# Patient Record
Sex: FEMALE | Race: WHITE
[De-identification: names, ages, dates, MRNs, and addresses within clinical notes are randomized per-mention and may not be internally consistent; named-entity substitution may affect disease eponyms.]

---

## 2023-02-12 ENCOUNTER — HOSPITAL ENCOUNTER (EMERGENCY)
Dept: HOSPITAL 97 - ER | Age: 58
LOS: 1 days | Discharge: HOME | End: 2023-02-13
Payer: COMMERCIAL

## 2023-02-12 DIAGNOSIS — Z98.84: ICD-10-CM

## 2023-02-12 DIAGNOSIS — I10: ICD-10-CM

## 2023-02-12 DIAGNOSIS — R10.9: Primary | ICD-10-CM

## 2023-02-12 DIAGNOSIS — Z88.5: ICD-10-CM

## 2023-02-12 LAB
ALBUMIN SERPL BCP-MCNC: 4 G/DL (ref 3.4–5)
ALP SERPL-CCNC: 57 U/L (ref 45–117)
ALT SERPL W P-5'-P-CCNC: 25 U/L (ref 13–56)
AST SERPL W P-5'-P-CCNC: 25 U/L (ref 15–37)
BUN BLD-MCNC: 21 MG/DL (ref 7–18)
GLUCOSE SERPLBLD-MCNC: 105 MG/DL (ref 74–106)
HCT VFR BLD CALC: 37.5 % (ref 36–45)
LIPASE SERPL-CCNC: 164 U/L (ref 73–393)
LYMPHOCYTES # SPEC AUTO: 1.4 K/UL (ref 0.7–4.9)
MCV RBC: 87.9 FL (ref 80–100)
PMV BLD: 7.9 FL (ref 7.6–11.3)
POTASSIUM SERPL-SCNC: 3.4 MMOL/L (ref 3.5–5.1)
RBC # BLD: 4.27 M/UL (ref 3.86–4.86)

## 2023-02-12 PROCEDURE — 74177 CT ABD & PELVIS W/CONTRAST: CPT

## 2023-02-12 PROCEDURE — 81015 MICROSCOPIC EXAM OF URINE: CPT

## 2023-02-12 PROCEDURE — 80053 COMPREHEN METABOLIC PANEL: CPT

## 2023-02-12 PROCEDURE — 83690 ASSAY OF LIPASE: CPT

## 2023-02-12 PROCEDURE — 36415 COLL VENOUS BLD VENIPUNCTURE: CPT

## 2023-02-12 PROCEDURE — 81003 URINALYSIS AUTO W/O SCOPE: CPT

## 2023-02-12 PROCEDURE — 96375 TX/PRO/DX INJ NEW DRUG ADDON: CPT

## 2023-02-12 PROCEDURE — 96361 HYDRATE IV INFUSION ADD-ON: CPT

## 2023-02-12 PROCEDURE — 96374 THER/PROPH/DIAG INJ IV PUSH: CPT

## 2023-02-12 PROCEDURE — 99284 EMERGENCY DEPT VISIT MOD MDM: CPT

## 2023-02-12 PROCEDURE — 85025 COMPLETE CBC W/AUTO DIFF WBC: CPT

## 2023-02-12 NOTE — RAD REPORT
EXAM DESCRIPTION:  CTAbdomen   Pelvis W Contrast - 2/12/2023 10:12 pm

 

CLINICAL HISTORY:  Abdominal pain.

ABD PAIN

 

COMPARISON:  <Comparisons>

 

TECHNIQUE:  Biphasic CT imaging of the abdomen and pelvis was performed with 100 ml non-ionic IV cont
rast.

 

All CT scans are performed using dose optimization technique as appropriate and may include automated
 exposure control or mA/KV adjustment according to patient size.

 

FINDINGS:  The lung bases are clear.Postsurgical changes involving the stomach.

 

The liver, spleen, pancreas, adrenal glands and kidneys are within normal limits. Small benign liver 
cysts.

 

No bowel obstruction, free air, free fluid or abscess. Small fat containing ventral hernia. The appen
jovon is normal. Sigmoid diverticulosis coli without diverticulitis. No evidence of significant lymphad
enopathy.

 

Mild lumbar degenerative changes.

 

IMPRESSION:  No acute intra-abdominal or pelvic finding.

 

Sigmoid diverticulosis coli without diverticulitis.

## 2023-02-12 NOTE — XMS REPORT
Continuity of Care Document

                          Created on:2023



Patient:ADRIANNE KAMARA

Sex:Female

:1965

External Reference #:[id number]





Demographics







                          Address                   [address]



                                                    [place] [address]

 

                          Home Phone                [phone]

 

                          Work Phone                [phone]

 

                          Mobile Phone              [phone]

 

                          Email Address             [place]

 

                          Preferred Language        English

 

                          Marital Status            Unknown

 

                          Latter-day Affiliation     Unknown

 

                          Race                      Unknown

 

                          Additional Race(s)        White

 

                          Ethnic Group              Unknown









Author







                          Organization              [facility]

t

 

                          Address                   [address]



                                                    [place] [address]

 

                          Phone                     [phone]









Support







                Name            Relationship    Address         Phone

 

                Adrianne Kamara Unavailable     [address]    [phone]



                                                [place] [address] 

 

                Jeanette Newell   Other           [address] [phone]



                                                [place] [address] 









Care Team Providers







                    Name                Role                Phone

 

                    FOUND, PCP NOT      Primary Care Physician Unavailable

 

                    [email or web address]_G  Attending Clinician Unavailable

 

                    NEIL MCNULTY FNP Attending Clinician Unavailable

 

                    GC_[email or web address]_D  Attending Clinician Unavailable

 

                    GC_[email or web address]_Mammo    Attending Clinician Unavailable

 

                    Neil Mcnulty       Attending Clinician [phone]

 

                    Kimberly Christensen          Attending Clinician [phone]

 

                    Arnoldo Garcia      Attending Clinician [phone]

 

                    Ang Morales Attending Clinician [phone]

 

                    Nemesio Cardenas  Attending Clinician [phone]

 

                    GC_[email or web address]_G  Admitting Clinician Unavailable

 

                    GC_[email or web address]_D  Admitting Clinician Unavailable

 

                    [email or web address]    Admitting Clinician Unavailable

 

                    Nemesio Cardenas  Admitting Clinician [phone]









Payers







           Payer Name Policy Type Policy Number Effective Date Expiration Date [facility]            [id number]                        







Problems







       Condition Condition Condition Status Onset  Resolution Last   Treating Co

mments 

Source



       Name   Details Category        Date   Date   Treatment Clinician        



                                                 Date                 

 

       Seasonal Seasonal Problem Active                              [facility]

a



       allergic Allergic                                              [facility]

l



       rhinitis Rhinitis               00:00:                             



                                   00                                 

 

       Vaginal Vaginal Problem Active                              [facility]



       dryness Dryness               3-                               Medical



                                   00:00:                             



                                   00                                 

 

       Bilateral Bilateral Problem Active                              [facility]

via



       temporoman Temporoman               3-17                               Me

dical



       dibular dibular               00:00:                             



       joint  Joint                00                                 



       disorder Disorder                                                  

 

       WEAKNESS  WEAKNESS Diagnosis Active 2019-1        2019-10-19             

  [facility]



              Active               0          11:00:00               l



              10/14/2019               07:00:                             Allen garsia



               [facility]               00                                 



              Re[facility]                                                   

 

       M25.511  M25.511 Diagnosis Active 2019               

[facility]



              Active                         15:06:00               l



              2019               00:00:                             Allen garsia



                [facility]               00                                 



              Re[facility]                                                   

 

       MORBID   MORBID Diagnosis Active 2018-0        2018-10-03               M

[facility]oria



       OBESITY OBESITY                         12:18:00               l



              Active               00:00:                             Anibal



              2018               00                                 



               [facility]                                                  

 

       Herpes Herpes Problem Active                              [facility]



       simplex Simplex                                              Medical



                                   00:00:                             



                                   00                                 

 

       Hyperlipid Hyperlipid Problem Active                              P

rivia



       emia   emia                                                Medical



                                   00:00:                             



                                   00                                 

 

       Depressive Depressive Problem Active                              P

rivia



       disorder Disorder                                              Medica

l



                                   00:00:                             



                                   00                                 

 

       Attention Attention Problem Active                              [facility]

via



       deficit Deficit                                              Medical



       hyperactiv Hyperactiv               00:00:                             



       ity    ity                  00                                 



       disorder Disorder                                                  

 

       Hypertensi Hypertensi Problem Active                              P

rivia



       ve     ve                                                  Medical



       disorder Disorder               00:00:                             



                                   00                                 

 

       Acid   Acid   Problem Active                              [facility]



       reflux Reflux                                              Medical



                                   00:00:                             



                                   00                                 

 

       Irritable Irritable Problem Active                              [facility]

via



       bowel  Bowel                                               Medical



       syndrome Syndrome               00:00:                             



                                   00                                 

 

       Osteoarthr Osteoarthr Problem Active                              P

rivia



       itis   itis                                                Medical



                                   00:00:                             



                                   00                                 

 

       Multiple Multiple Problem Active                              [facility]

a



       environmen Environmen                                              Me

dical



       geraldine    geraldine                  00:00:                             



       allergies Allergies               00                                 

 

       Preop  Preop  Disease Active                              [facility]



       cardiovasc cardiovasc               4-13                               st



       ular exam ular exam               00:00:                             Hosp

oksana



                                   00                                 l

 

       Mixed  Mixed  Disease Active                              [facility]



       hyperlipid hyperlipid               4-13                               st



       emia   emia                 00:00:                             [facility]



                                   00                                 l

 

       Essential Essential Disease Active                              Met

hodi



       hypertensi hypertensi               4-13                               st



       on     on                   00:00:                             [facility]



                                   00                                 l

 

       Morbid Morbid Disease Active                              [facility]



       obesity obesity                                              st



       due to due to               00:00:                             [facility]



       excess excess               00                                 l



       calories calories                                                  

 

       Depression Depression Disease Active                              M

ethodi



                                   7-29                               st



                                   00:00:                             [facility]



                                   00                                 l

 

       GERD   GERD   Disease Active                              [facility]



       (gastroeso (gastroeso               



       phageal phageal               00:00:                             [facility]



       reflux reflux               00                                 l



       disease) disease)                                                  

 

       Osteoarthr Osteoarthr Disease Active                              M

ethodi



       itis   itis                 



                                   00:00:                             [facility]



                                   00                                 l

 

       Sciatica Sciatica Disease Active                              [facility]

di



                                   



                                   00:00:                             [facility]



                                   00                                 l

 

       Asthma Asthma Problem Active                                    [facility]



                                                                      Medical

 

       Thyroid Thyroid Problem Active                                    [facility]



       function Function                                                  [facility]ica

l



       tests  Tests                                                   



       abnormal Abnormal                                                  

 

       Hyperlipid  Hyperlipi Problem                      2019            

   [facility]



       emia,  demia,                             11:24:37               l



       unspecifie unspecifie                                                  He

rmann



       d      d                                                       



              2019                                                  



              [facility]                                                  

 

       Essential  Essential Problem                      2019             

  [facility]



       (primary) (primary)                             11:24:37               l



       hypertensi hypertensi                                                  He

rmann



       on     on                                                      



              2019                                                  



              [facility]                                                  

 

       Gastro-eso  Gastro-es Problem                      2019            

   [facility]



       phageal ophageal                             11:24:37               l



       reflux reflux                                                  [facility]



       disease disease                                                  



       without without                                                  



       esophagiti esophagiti                                                  



       s      s                                                       



              2019                                                  



              [facility]                                                  

 

       Diaphragma        Problem                      2019               M

emoria



       tic hernia Diaphragma                             11:24:37               

l



       without tic hernia                                                  Deanna

nn



       obstructio without                                                  



       n or   obstructio                                                  



       gangrene n or                                                    



              gangrene                                                  



              2019                                                  



               [facility]                                                  

 

       Body mass  Body mass Problem                      2019             

  [facility]



       index  index                              11:24:37               l



       (BMI)  (BMI)                                                   [place]



       45.0-49.9, 45.0-49.9,                                                  



       adult  adult                                                   



              2019                                                  



              [facility]                                                  

 

       Mixed   Mixed Problem Active               2019-11-15               [facility]

ia



       anxiety anxiety                             00:29:56               l



       and    and                                                     [facility]



       depressive depressive                                                  



       disorder disorder                                                  



       (disorder) (disorder)                                                  



               Active                                                  



              Problem                                                  



              11/15/2019                                                  



              IRIS Post,                                                  



              ELIZABETH Alligator,                                                  



              [facility]                                                  

 

       History of  History Problem Active               2019-11-15              

 [facility]



       - obesity of -                               00:29:56               l



       (context-d obesity                                                  Deanna

nn



       ependent (context-d                                                  



       category) ependent                                                  



              category)                                                  



              Active                                                  



              Problem                                                  



              11/15/2019                                                  



              IRIS Post,                                                  



              ELIZABETH Alligator,                                                  



              [facility]                                                  

 

       Obstructiv  Obstructi Problem Active               2019-11-15            

   [facility]



       e sleep ve sleep                             00:29:56               l



       apnea  apnea                                                   [place]



       syndrome syndrome                                                  



       (disorder) (disorder)                                                  



              Active                                                  



              Problem                                                  



              11/15/2019                                                  



               [facility]                                                  



              Rehab,                                                  



              ELIZABETH Alligator,                                                  



              [facility]                                                  

 

       PAIN IN  PAIN IN Diagnosis Active               2019               

[facility]



       RIGHT  RIGHT                              15:06:00               l



       SHOULDER SHOULDER                                                  Allen

n



              Active                                                   



              [facility]                                                  

 

       MORBID  MORBID Diagnosis Active               2018-10-03               Me

moria



       (SEVERE) (SEVERE)                             12:18:00               l



       OBESITY OBESITY                                                  Anibal



       DUE TO DUE TO                                                  



       EXCESS CA EXCESS CA                                                  



              Active  [facility]                                                  







History of Past Illness







       Condition Condition Condition Status Onset  Resolution Last   Treating Co

mments 

Source



       Name   Details Category        Date   Date   Treatment Clinician        



                                                 Date                 

 

       Encounter  Encounter Problem        2019         

      [facility]



       for    for                     13:06:33 13:06:33               l



       screening screening               06:03:                             [facility]

seda



       mammogram mammogram               46                                 



       for    for                                                     



       malignant malignant                                                  



       neoplasm neoplasm                                                  



       of breast of breast                                                  



              2018                                                  



              9  ELIZABETH                                                  



              Alligator                                                  

 

       Morbid  Morbid Problem        2019               

[facility]



       (severe) (severe)               0-11   11:24:37 11:24:37               l



       obesity obesity               04:17:                             [facility]



       due to due to               40                                 



       excess excess                                                  



       calories calories                                                  



              10/11/2018                                                  



              2019                                                  



              [facility]                                                  







Allergies, Adverse Reactions, Alerts







       Allergy Allergy Status Severity Reaction(s) Onset  Inactive Treating Comm

ents 

Source



       Name   Type                        Date   Date   Clinician        

 

       Codeshena Mota Active        Other (See                "Makes me 

[facility]



              ty to                Comments)                  tense" st



              adverse                      00:00:                      [facility]



              reaction                      00                          l



              s to                                                    



              drug                                                    

 

       Morphine Propensi Active        Itching                       [facility]

di



              ty to                                               st



              adverse                      00:00:                      [facility]



              reaction                      00                          l



              s to                                                    



              drug                                                    

 

       morphine morphine Active                                           [facility]

a



                                                                      l



                                                                      [place]

 

       Lisinopr Allergy Active Mild   Cough                              [facility]



       il     to                                                      Medical



              substanc                                                  



              e                                                       







Family History







           Family Member Diagnosis  Comments   Start Date Stop Date  Source

 

           Paternal   Heart disease                                  [facility]

 

           Paternal   Cancer                                      [facility]

 

           Paternal   Colon cancer                                  [facility]

 

           Paternal uncle Diabetes                                    [facility]

 

           Natural father Heart disease                                  [facility]

 

           Natural father Hypertension                                  [facility]

 

           Maternal   Heart disease                                  [facility]

 

           Maternal   Pancreatic cancer                                  [facility]

 

           Natural mother Anesthesia problems                                  [facility]

 

           Natural mother Arthritis                                   [facility]







Social History







           Social Habit Start Date Stop Date  Quantity   Comments   Source

 

           History [facility] Tobacco                                             [facility]



           Use                                                    [facility]

ent



                                                                  Clinics

 

           Alcohol intake 2018 Current               [facility]



                      00:00:00   00:00:00   non-drinker of            Hospital



                                            alcohol               



                                            (finding)             

 

           Cigarettes smoked 2018                       [facility]

st



           current (pack per 00:00:00   00:00:00                         [facility]osp[facility]

l



           day) - Reported                                             

 

           Cigarette  2018                       [facility]



           pack-years 00:00:00   00:00:00                         Hospital

 

           Tobacco use and 2018 Smokeless             [facility]



           exposure   00:00:00   00:00:00   tobacco non-user            Hospital

 

           Sex Assigned At 1965                       [facility]



           B[facility]      00:00:00   [facility]:00:00                         [facility]









                Smoking Status  Start Date      Stop Date       Source

 

                Social History  2018-10-01 13:01:58 2018-10-01 13:01:58 [facility]







Medications







       Ordered Filled Start  Stop   Current Ordering Indication Dosage Frequency

 Signature

                    Comments            Components          Source



     Medication Medication Date Date Medication? Clinician                (SIG) 

          



     Name Name                                                   

 

     diclofenac      2018      Yes            2g   Q.25D Apply 2 g           M

ethodi



     (VOLTAREN)      0-17                               topically           st



     1 % gel      08:02:                               4 (four)           Hospit

a



               17                                 times a           l



                                                  day.           

 

     VITAMIN B      2018      Yes                      Take by           [facility]

di



     COMPLEX (B      0-17                               mouth.           st



     COMPLEX 1      08:02:                                              Hospita



     ORAL)      17                                                l

 

     ubidecareno      2018      Yes                      Take by           [facility] (CO Q-10      0-17                               mouth.           st



     ORAL)      08:02:                                              Hospita



               17                                                l

 

     Phenergan      2018      Yes                      25 mg = 1           [facility]

[facility]



     25 mg oral      0-03                               tab, PO,           l



     tablet      14:27:                               Q6H, PRN           [place]



               00                                 Nausea, #           



                                                  20 tab, 1           



                                                  Refill(s),           



                                                  given to           



                                                  patient           

 

     Famotidine      2018      Yes                      20 mg = 1           [facility]

[facility]



     20 MG Oral      0-03                               tab, PO,           l



     Tablet      14:27:                               BID, # 180           [facility]

nn



     [Pepcid]      00                                 tab, 0           



                                                  Refill(s),           



                                                  given to           



                                                  patient           

 

     Acetaminoph      2018      No                       15 ml, PO,           

[facility]



     en 21.7      0-03                               Q4H, PRN           l



     MG/ML /      14:27:                               Pain, X 7           [facility]

nn



     Hydrocodone      00                                 day, # 400           



     Bitartrate                                         mL, 0           



     0.5 MG/ML                                         Refill(s),           



     Oral                                         given to           



     Solution                                         patient           

 

     Lovenox      2018      No                       Notes:           [facility]



               0-03                               (Same as:           l



               12:00:                               Lovenox)                                                           

 

     Famotidine      2018      No                       Notes:           [facility]

ia



               0-03                               (Same as:           l



               02:00:                               Pepcid)                                            Can be           



                                                  dilute in           



                                                  5-10cc NS           



                                                  IVP: Slow           



                                                  IV push           



                                                  over at           



                                                  least 2           



                                                  minutes.           

 

     glycopyrrol      2018      No                       Route: IV,           

[facility]



     ate (ANES)      0-02                               Drug form:           l



               15:59:                               INJ, ONCE,                                            Stop date:           



                                                  10/02/18           



                                                  10:59:00           



                                                  CDT            

 

     dexamethaso      2018      No                       Route: IV,           

[facility]



     ne (ANES)      0-02                               Drug form:           l



               15:59:                               INJ, ONCE,                                            Stop date:           



                                                  10/02/18           



                                                  10:59:00           



                                                  CDT            

 

     neostigmine      2018      No                       Route: IV,           

[facility]



     (ANES)      0-02                               Drug form:           l



               15:59:                               INJ, ONCE,                                            Stop date:           



                                                  10/02/18           



                                                  10:59:00           



                                                  CDT            

 

     cefOXitin      2018      No                       Route: IV,           [facility]

[facility]



     (ANES)      0-02                               Drug form:           l



               15:52:                               INJ, ONCE,                                            Stop date:           



                                                  10/02/18           



                                                  10:52:00           



                                                  CDT            

 

     ondansetron      2018      No                       Route: IV,           

[facility]



     (ANES)      0-02                               Drug form:           l



               15:47:                               INJ, ONCE,                                            Stop date:           



                                                  10/02/18           



                                                  10:47:00           



                                                  CDT            

 

     hydromorpho      2018      No                       Route: IV,           

[facility]



     ne (ANES)      0-02                               Drug form:           l



               15:47:                               INJ, ONCE,                                            Stop date:           



                                                  10/02/18           



                                                  10:47:00           



                                                  CDT            

 

     Flumazenil      2018      No                       Notes:           [facility]

ia



               0-02                               (Same as:           l



               15:36:                               Romazicon)                                                           

 

     Naloxone      2018      No                       Notes:           [facility]



               0-02                               Same as           l



               15:36:                               Narcan                                                           

 

     Atropine      2018      No                       Notes: ***           [facility]

[facility]



               0-02                               MEDICATION           l



               15:36:                               WASTE ***           [place]                                 Product           



                                                  Size: 0.4           



                                                  mg Product           



                                                  Wasted:           



                                                  ___ mg           

 

     Ephedrine      2018      No                       Notes:           [facility]

a



               0-02                               final           l



               15:36:                               concentrat           [place]                                 ion 5           



                                                  mg/mL           

 

     Diphenhydra      2018      No                       Notes:           [place]

[facility]



     mine      0-02                               (Same as:           l



               15:36:                               Benadryl)                                                           

 

     Albuterol      2018      No                       Notes: SEE           [facility]

[facility]



     0.83 MG/ML      0-02                               RT             l



     Inhalant      15:36:                               DOCUMENTAT           [facility]

buitrago



     Solution      00                                 ION (Same           



                                                  as:            



                                                  Proventil)           

 

     Glycopyrrol      2018      No                       Notes:           [place]

shai



     ate       0-02                               (Same as:           l



               15:36:                               Robinul)                                                           

 

     Dexamethaso      2018      No                       Notes:           [place]

[facility]



     ne        0-02                               Concentrat           l



               15:36:                               ion:           [place]                                 4mg/ml           

 

     Ondansetron      2018      No                       Notes:           [place]

[facility]



               0-02                               (Same as:           l



               15:36:                               Zofran)                                            ***            



                                                  MEDICATION           



                                                  WASTE ***           



                                                  Product           



                                                  Size: 4 mg           



                                                  Product           



                                                  Wasted:           



                                                  ___ mg           

 

     Hydromorpho      2018      No                       Notes:           [place]

[facility]



     ne        0-02                               Same as:           l



               15:36:                               Dilaudid                                                           

 

     Oxycodone      2018      No                       Notes:           [facility]

a



               0-02                               (Same as:           l



               15:36:                               Roxicodone                                            )              

 

     Fentanyl      2018      No                       Notes:           [facility]



               0-02                               (Same as:           l



               15:36:                               Sublimaze)                                            Preservati           



                                                  ve free.           

 

     Hydralazine      2018      No                       Notes:           [place]

[facility]



               0-02                               (Same as:           l



               15:36:                               Apresoline                                            ) Push           



                                                  over 5           



                                                  minutes           

 

     Labetalol      2018      No                       10 mg, 2           [place]

sh[facility]



               0-02                               mL, Route:           l



               15:36:                               IVP, Drug                                            form: INJ,           



                                                  Q5Min,           



                                                  Dosing           



                                                  Weight           



                                                  110, kg,           



                                                  PRN            



                                                  Elevated           



                                                  BP, Start           



                                                  date:           



                                                  10/02/18           



                                                  10:36:00           



                                                  CDT,           



                                                  Duration:           



                                                  5 doses or           



                                                  times,           



                                                  Stop date:           



                                                  Limited #           



                                                  of times           

 

     Metoprolol      2018      No                       Notes:           [facility]

ia



               0-02                               (Same as:           l



               15:36:                               Lopressor)           [place]



               00                                 Push over           



                                                  2 minutes           

 

     Calcium      2018      No                       1,000 mL,           [facility]

ia



     Chloride      0-02                               Rate: 125           l



     0.0014      15:36:                               ml/hr,           [facility]



     MEQ/ML /      00                                 Infuse           



     Potassium                                         over: 8           



     Chloride                                         hr, Route:           



     0.004                                         IV, Dosing           



     MEQ/ML /                                         Weight 110           



     Sodium                                         kg, Total           



     Chloride                                         Volume:           



     0.103                                         1,000,           



     MEQ/ML /                                         Start           



     Sodium                                         date:           



     Lactate                                         10/02/18           



     0.028                                         10:36:00           



     MEQ/ML                                         CDT,           



     Injectable                                         Duration:           



     Solution                                         30 day,           



                                                  Stop date:           



                                                  18           



                                                  10:35:00           



                                                  CDT, 2.24,           



                                                  m2             

 

     enalapril      2018      No                       Notes:           [facility]

a



               0-02                               (Same as:           l



               15:28:                               Vasotec-IV           [place]



                                                )              

 

     Dilaudid      2018      No                       Notes:           [facility]



               0-02                               Same as:           l



               15:28:                               Dilaudid           [place]                                                

 

     Benadryl      2018      No                       Notes:           [facility]



               0-02                               (Same as:           l



               15:28:                               Benadryl)           [place]



               00                                                

 

     Sodium      2018      No                       984.8 mL,           [facility]

a



     Chloride      0-02                               Rate: 100           l



     0.9% IV      15:28:                               ml/hr,           [place]



     984.8 mL +      00                                 Infuse           



     M.V.I.-12                                         over: 10           



     10 mL Daily                                         hr, Route:           



     + folic                                         IV, Dosing           



     acid IV 1                                         Weight 110           



     mg Daily +                                         kg, Total           



     thiamine IV                                         Volume:           



     5                                            1,000,           



                                                  Start           



                                                  date:           



                                                  10/02/18           



                                                  10:28:00           



                                                  CDT,           



                                                  Duration:           



                                                  1 doses or           



                                                  times,           



                                                  Stop date:           



                                                  10/02/18           



                                                  20:27:00           



                                                  CDT, 2.24,           



                                                  m2             

 

     Acetaminoph      2018      No                       Notes: Do           [facility]

[facility]



     en 21.7      0-02                               not exceed           l



     MG/ML /      15:28:                               4gm/day of           [facility]

seda



     Hydrocodone      00                                 acetaminop           



     Bitartrate                                         hen. (Same           



     0.5 MG/ML                                         as: [place]           



     Oral                                         325/7.5)           



     Solution                                                        

 

     Zofran      2018      No                       Notes:           [facility]



               0-02                               (Same as:           l



               15:28:                               Zofran)           [place]



               00                                 ***            



                                                  MEDICATION           



                                                  WASTE ***           



                                                  Product           



                                                  Size: 4 mg           



                                                  Product           



                                                  Wasted:           



                                                  ___ mg           

 

     Phenergan      2018      No                       Notes: Do           [facility]

[facility]



               0-02                               not give           l



               15:28:                               IV push.                                            (Same as:           



                                                  Phenergan)           

 

     normal      2018      No                       1,000 mL,           [facility]

a



     saline 0.9%      0-                               Rate: 125           l



     IV 1,000 mL      15:28:                               ml/hr,           [facility]

                                 Infuse           



                                                  over: 8           



                                                  hr, Route:           



                                                  IV, Dosing           



                                                  Weight 110           



                                                  kg, Total           



                                                  Volume:           



                                                  1,000,           



                                                  Start           



                                                  date:           



                                                  10/02/18           



                                                  10:28:00           



                                                  CDT,           



                                                  Duration:           



                                                  30 day,           



                                                  Stop date:           



                                                  18           



                                                  10:27:00           



                                                  CDT, 2.24,           



                                                  m2             

 

     rocuronium      2018      No                       Route: IV,           [facility]

[facility]



     (ANES)      0-02                               Drug form:           l



               15:12:                               INJ, ONCE,                                            Stop date:           



                                                  10/02/18           



                                                  10:12:00           



                                                  CDT            

 

     ePHEDrine      2018      No                       Route: IV,           [facility]

[facility]



     (ANES)      0-02                               Drug form:           l



               15:12:                               INJ, ONCE,                                            Stop date:           



                                                  10/02/18           



                                                  10:12:00           



                                                  CDT            

 

     phenylephri      2018      No                       Route: IV,           

[facility]



     ne (ANES)      0-02                               Drug form:           l



               15:12:                               INJ, ONCE,                                            Stop date:           



                                                  10/02/18           



                                                  10:12:00           



                                                  CDT            

 

     fentaNYL      2018      No                       Route: IV,           [facility]

[facility]



     (ANES)      0-02                               Drug form:           l



               15:12:                               INJ, ONCE,                                            Stop date:           



                                                  10/02/18           



                                                  10:12:00           



                                                  CDT            

 

     propofol      2018      No                       Route: IV,           [facility]

[facility]



     (ANES)      0-02                               Drug form:           l



               15:12:                               INJ, ONCE,                                            Stop date:           



                                                  10/02/18           



                                                  10:12:00           



                                                  CDT            

 

     midazolam      2018      No                       Route: IV,           Me

[facility]



     (ANES)      0-02                               Drug form:           l



               14:57:                               SOLN,                                            ONCE, Stop           



                                                  date:           



                                                  10/02/18           



                                                  9:57:00           



                                                  CDT            

 

     famotidine      2018      No                       Route: IV,           M

[facility]



     (ANES)      0-02                               Drug form:           l



               14:57:                               INJ, ONCE,                                            Stop date:           



                                                  10/02/18           



                                                  9:57:00           



                                                  CDT            

 

     magnesium      2018      No                       Route: IV,           Me

[facility]



     sulfate      0-02                               Drug form:           l



     (ANES) 40      14:20:                               INJ, Start           He

rmann



     mg        00                                 date:           



                                                  10/02/18           



                                                  9:20:00           



                                                  CDT, Stop           



                                                  date:           



                                                  10/02/18           



                                                  10:20:00           



                                                  CDT            

 

     Lactated      2018      No                       Route: IV,           [facility]

oria



     Ringers      0-02                               Total           l



     Injection      14:16:                               Volume:           [facility]

nn



     IV (ANES)      00                                 1,000,           



     1000 mL                                         Start           



                                                  date:           



                                                  10/02/18           



                                                  9:16:00           



                                                  CDT, Stop           



                                                  date:           



                                                  10/02/18           



                                                  10:16:00           



                                                  CDT            

 

     magnesium      2018      No                       Notes:           [facility]

a



     sulfate 2gm      0-02                               WASTE: F/P           l



     / NS 50ml      14:00:                               - Sink; E           Her

buitrago



     (premixed)                                       -              



                                                  Municipal           



                                                  Trash Bin           

 

     72 HR      2018      No                       1.5 mg = 1           [facility]

a



     Scopolamine      0-02                               patch,           l



     0.0139      13:51:                               TOP, Q72H,           [facility]

nn



     MG/HR      00                                 PRN            



     Transdermal                                         Other-See           



     Patch                                         Comments,           



                                                  # 4 ea, 0           



                                                  Refill(s)           

 

     aprepitant      2018      No                       40 mg = 1           Me

[facility]



     40 mg oral      0-02                               cap, PO,           l



     capsule      13:51:                               ONCE, # 1           [facility]

nn



               00                                 cap, 0           



                                                  Refill(s)           

 

     Lactated      2018      No                       1,000 mL,           [place]

shai



     Ringers IV      0-02                               Rate: 40           l



     1,000 mL      13:46:                               ml/hr,           [facility]                                 Infuse           



                                                  over: 25           



                                                  hr, Route:           



                                                  IV, Dosing           



                                                  Weight 110           



                                                  kg, Total           



                                                  Volume:           



                                                  1,000,           



                                                  Start           



                                                  date:           



                                                  10/02/18           



                                                  8:46:00           



                                                  CDT,           



                                                  Duration:           



                                                  30 day,           



                                                  Stop date:           



                                                  18           



                                                  8:45:00           



                                                  CDT, 2.24,           



                                                  m2             

 

     Magnesium      2018      No                       Notes:           [facility]

a



     Sulfate      0-02                               WASTE: F/P           l



               01:00:                               - Sink; E           [facility]



               00                                 -              



                                                  Municipal           



                                                  Trash Bin           

 

     Zofran ODT      2018      No                       Notes:           [facility]

ia



               0-02                               (Same as:           l



               00:29:                               Zofran           [place]                                 ODT)           

 

     heparin      2018      No                       Notes:           [facility]



               0-01                               porcine           l



               16:00:                               heparin           [facility]                                                

 

     Mefoxin +      2018      No                       Notes:           [facility]

a



     sterile      0-01                               (Same As:           l



     water 20 mL      16:00:                               Mefoxin)           

[facility]ann



               00                                 ***            



                                                  MEDICATION           



                                                  WASTE ***           



                                                  Product           



                                                  Size: 2000           



                                                  mg Product           



                                                  Wasted:           



                                                  ___ mg           

 

     Tylenol      2018      No                       PO, PRN, 0           [place]

shai



               0-01                               Refill(s)           l



               13:08:                                              [facility]



               00                                                

 

     Ibuprofen      2018      No                       400 mg = 2           Me

[facility]



     200 MG Oral      0-01                               tab, PO,           l



     Tablet      13:07:                               PRN, PRN           [place]



     [Advil]      00                                 Pain, #           



                                                  120 tab, 0           



                                                  Refill(s)           

 

     Ventolin      2018      Yes                      2 puff,           [facility]

a



     HFA 90      0-01                               INHALER,           l



     mcg/inh      13:06:                               Q4H, PRN           [facility]

n



     inhalation      00                                 wheezing,           



     aerosol                                         coughing,           



     with                                         or             



     adapter                                         shortness           



                                                  of breath,           



                                                  # 8 gm, 1           



                                                  Refill(s)           

 

     120 ACTUAT      2018      Yes                      INHALATION           M

[facility]



     mometasone      0-01                               , BID, 0           l



     furoate 0.1      13:05:                               Refill(s)           H

[facility]



     MG/ACTUAT      00                                                



     Metered                                                        



     Dose                                                        



     Inhaler                                                        



     [Asmanex]                                                        

 

     Fluticasone      2018      Yes                      2 spray,           Me

[facility]



     propionate      0-01                               NASAL,           l



     0.05      13:04:                               Daily, in           [place]



     MG/ACTUAT      00                                 each           



     Metered                                         nostril, #           



     Dose Nasal                                         16 gm, 1           



     Spray                                         Refill(s)           

 

     olmesartan      2018      Yes                      40 mg = 1           Me

[facility]



     40 mg oral      0-01                               tab, PO,           l



     tablet      13:04:                               Daily, #           [place]



               00                                 30 tab, 0           



                                                  Refill(s)           

 

     Hydrochloro      2018      No                       50 mg, PO,           

[facility]



     thiazide      0-01                               Daily, 0           l



               13:04:                               Refill(s)           [place]



               00                                                

 

     BENICAR HCT            Yes            1{tbl} QD   Take 1           Me

thodi



     40-25 mg      4-13                               tablet by           st



     per tablet      00:00:                               mouth           [facility]

a



               00                                 daily.           l

 

     ergocalcife      2017      Yes                      TK ONE C           Me

thodi



     rol       2-21                               PO Q           st



     (VITAMIN      00:00:                               MONDAY AND           [facility]

ledy



     D2) 50,000      00                                 THURS           l



     unit                                                        



     capsule                                                        

 

     EVAMIST      2017      Yes                      JONATHAN 1           [facility]



     1.53      2-21                               SPRAY QD           st



     mg/spray      00:00:                               UTD            [facility]



     (1.7%)      00                                                l



     transdermal                                                        



     spray                                                        

 

     VENTOLIN            Yes                      INHALE 2           [facility]

di



     HFA 90      9-30                               PUFFS PO Q           st



     mcg/actuati      00:00:                               4 TO 6 H           [facility]

[facility]



     on inhaler      00                                 PRN FOR           l



                                                  SHORTNESS           



                                                  OF BREATH           

 

     progesteron            Yes            100mg QD   Take 100           [facility]

[facility]



     e         6-07                               mg by           st



     (PROMETRIUM      00:00:                               mouth           [facility]

ta



     ) 100 MG      00                                 nightly.           l



     capsule                                                        

 

     montelukast            Yes            10mg QD   Take 10 mg           

[facility]



     (SINGULAIR)      5-31                               by mouth           st



     10 mg      00:00:                               nightly.           [facility]



     tablet      00                                                l

 

     Asmanex HFA Asmanex HFA           No                       Asmanex         

  [facility]



     100  100                                                [facility]



     mcg/actuati mcg/actuati                                    mcg/actuat      

     



     on aerosol on aerosol                                    ion            



     inhaler inhaler                                    aerosol           



     INHALE 2 INHALE 2                                    inhaler           



     PUFFS BY PUFFS BY                                    INHALE 2           



     MOUTH TWICE MOUTH TWICE                                    PUFFS BY        

   



     DAILY DAILY                                    MOUTH           



                                                  TWICE           



                                                  DAILY           

 

     Bepreve 1.5 Bepreve 1.5           No                       Bepreve         

  [facility]



     % eye drops % eye drops                                    1.5 % eye       

    [facility]



     INSTILL 1 INSTILL 1                                    drops           



     DROP INTO DROP INTO                                    INSTILL 1           



     BOTH EYES BOTH EYES                                    DROP INTO           



     BID  BID                                     BOTH EYES           



                                                  BID            

 

     cyclobenzap cyclobenzap           No                       cyclobenza      

     [facility]



     rine 10 mg rine 10 mg                                    prine 10          

 [facility]



     tablet TAKE tablet TAKE                                    mg tablet       

    



     1/2 TO 1 1/2 TO 1                                    TAKE 1/2           



     TABLET BY TABLET BY                                    TO 1           



     MOUTH EVERY MOUTH EVERY                                    TABLET BY       

    



     NIGHT AT NIGHT AT                                    MOUTH           



     BEDTIME AS BEDTIME AS                                    EVERY           



     NEEDED NEEDED                                    NIGHT AT           



                                                  BEDTIME AS           



                                                  NEEDED           

 

     doxycycline doxycycline           No                       doxycyclin      

     [facility]



     hyclate 100 hyclate 100                                    e hyclate       

    [facility]



     mg capsule mg capsule                                    100 mg           



     TAKE 1 TAKE 1                                    capsule           



     CAPSULE BY CAPSULE BY                                    TAKE 1           



     MOUTH TWICE MOUTH TWICE                                    CAPSULE BY      

     



     DAILY DAILY                                    MOUTH           



                                                  TWICE           



                                                  DAILY           

 

     estradiol estradiol           No                       estradiol           

[facility]



     10 mcg 10 mcg                                    10 mcg           [facility]



     vaginal vaginal                                    vaginal           



     tablet 1 tablet 1                                    tablet 1           



     tab nightly tab nightly                                    tab            



     x 2 weeks, x 2 weeks,                                    nightly x         

  



     then twice then twice                                    2 weeks,          

 



     weekly weekly                                    then twice           



                                                  weekly           

 

     Evamist Evamist           No                       Evamist           [facility]



     1.53 1.53                                    1.53           [facility]



     mg/spray mg/spray                                    mg/spray           



     (1.7 %) (1.7 %)                                    (1.7 %)           



     transdermal transdermal                                    transderma      

     



     spray JONATHAN 1 spray JONATHAN 1                                    l spray         

  



     SPR TO SPR TO                                    JONATHAN 1 SPR           



     INNER INNER                                    TO INNER           



     SURFACE OF SURFACE OF                                    SURFACE OF        

   



     FOREARM D FOREARM D                                    FOREARM D           

 

     fluticasone fluticasone           No                       fluticason      

     [facility]



     propionate propionate                                    e              Med

ical



     50   50                                      propionate           



     mcg/actuati mcg/actuati                                    50             



     on nasal on nasal                                    mcg/actuat           



     spray,suspe spray,suspe                                    ion nasal       

    



     nsion SHAKE nsion SHAKE                                    spray,susp      

     



     LQ AND U 2 LQ AND U 2                                    ension           



     SPRAYS IEN SPRAYS IEN                                    SHAKE LQ          

 



     QD   QD                                      AND U 2           



                                                  SPRAYS IEN           



                                                  QD             

 

     methylpredn methylpredn           No                       methylpred      

     [facility]



     isolone 4 isolone 4                                    nisolone 4          

 [facility]



     mg tablets mg tablets                                    mg tablets        

   



     in a dose in a dose                                    in a dose           



     pack FOLLOW pack FOLLOW                                    pack           



     PACKAGE PACKAGE                                    FOLLOW           



     DIRECTIONS DIRECTIONS                                    PACKAGE           



                                                  DIRECTIONS           

 

     montelukast montelukast           No                       montelukas      

     [facility]



     10 mg 10 mg                                    t 10 mg           [facility]



     tablet TAKE tablet TAKE                                    tablet          

 



     1 TABLET BY 1 TABLET BY                                    TAKE 1          

 



     MOUTH AT MOUTH AT                                    TABLET BY           



     BEDTIME FOR BEDTIME FOR                                    MOUTH AT        

   



     ASTHMA OR ASTHMA OR                                    BEDTIME           



     ALLERGIES ALLERGIES                                    FOR ASTHMA          

 



                                                  OR             



                                                  ALLERGIES           

 

     olmesartan olmesartan           No                       olmesartan        

   [facility]



     20 mg 20 mg                                    20 mg           [facility]



     tablet TAKE tablet TAKE                                    tablet          

 



     1 TABLET BY 1 TABLET BY                                    TAKE 1          

 



     MOUTH EVERY MOUTH EVERY                                    TABLET BY       

    



     DAY  DAY                                     MOUTH           



                                                  EVERY DAY           

 

     oxybutynin oxybutynin           No                       oxybutynin        

   [facility]



     chloride ER chloride ER                                    chloride        

   [facility]



     10 mg 10 mg                                    ER 10 mg           



     tablet,exte tablet,exte                                    tablet,ext      

     



     nded nded                                    ended           



     release 24 release 24                                    release 24        

   



     hr 10 mg ER hr 10 mg ER                                    hr 10 mg        

   



     PO daily, PO daily,                                    ER PO           



     may incr. may incr.                                    daily, may          

 



     by 1 tablet by 1 tablet                                    incr. by 1      

     



     every week; every week;                                    tablet          

 



     Max: 30 mg Max: 30 mg                                    every           



                                                  week; Max:           



                                                  30 mg           

 

     Pepcid 20 Pepcid 20           No             1    Q1D  Pepcid 20           

[facility]



     mg tablet mg tablet                                    mg tablet           

[facility]



     Take 1 Take 1                                    Take 1           



     tablet tablet                                    tablet           



     every day every day                                    every day           



     by oral by oral                                    by oral           



     route. route.                                    route.           

 

     Proventil Proventil           No                       Proventil           

[facility]



     HFA 90 HFA 90                                    HFA 90           [facility]



     mcg/actuati mcg/actuati                                    mcg/actuat      

     



     on aerosol on aerosol                                    ion            



     inhaler inhaler                                    aerosol           



     INHALE 2 INHALE 2                                    inhaler           



     PUFFS BY PUFFS BY                                    INHALE 2           



     MOUTH EVERY MOUTH EVERY                                    PUFFS BY        

   



     4 TO 6 4 TO 6                                    MOUTH           



     HOURS AS HOURS AS                                    EVERY 4 TO           



     NEEDED FOR NEEDED FOR                                    6 HOURS AS        

   



     WHEEZING OR WHEEZING OR                                    NEEDED FOR      

     



     SHORTNESS SHORTNESS                                    WHEEZING           



     OF BREATH OF BREATH                                    OR             



                                                  SHORTNESS           



                                                  OF BREATH           

 

     Voltaren 1 Voltaren 1           No                       Voltaren 1        

   [facility]



     % topical % topical                                    % topical           

Medical



     gel JONATHAN 2 gel JONATHAN 2                                    gel JONATHAN 2           



     GRAMS EXT GRAMS EXT                                    GRAMS EXT           



     AA QID P OR AA QID P OR                                    AA QID P        

   



     INFLAMMATIO INFLAMMATIO                                    OR             



     N    N                                       INFLAMMATI           



                                                  ON             

 

     Xiidra 5 % Xiidra 5 %           No                       Xiidra 5 %        

   [facility]



     eye drops eye drops                                    eye drops           

Medical



     in a in a                                    in a           



     dropperette dropperette                                    dropperett      

     



     INSTILL 1 INSTILL 1                                    e INSTILL           



     DROP BOTH DROP BOTH                                    1 DROP           



     EYES TWICE EYES TWICE                                    BOTH EYES         

  



     DAILY DAILY                                    TWICE           



                                                  DAILY           

 

     Asmanex HFA Asmanex HFA           No                       Asmanex         

  [facility]



     100  100                                                Medical



     mcg/actuati mcg/actuati                                    mcg/actuat      

     



     on aerosol on aerosol                                    ion            



     inhaler inhaler                                    aerosol           



     INHALE 2 INHALE 2                                    inhaler           



     PUFFS BY PUFFS BY                                    INHALE 2           



     MOUTH TWICE MOUTH TWICE                                    PUFFS BY        

   



     DAILY DAILY                                    MOUTH           



                                                  TWICE           



                                                  DAILY           

 

     Bepreve 1.5 Bepreve 1.5           No                       Bepreve         

  [facility]



     % eye drops % eye drops                                    1.5 % eye       

    Medical



     INSTILL 1 INSTILL 1                                    drops           



     DROP INTO DROP INTO                                    INSTILL 1           



     BOTH EYES BOTH EYES                                    DROP INTO           



     BID  BID                                     BOTH EYES           



                                                  BID            

 

     cyclobenzap cyclobenzap           No                       cyclobenza      

     [facility]



     rine 10 mg rine 10 mg                                    prine 10          

 Medical



     tablet TAKE tablet TAKE                                    mg tablet       

    



     1/2 TO 1 1/2 TO 1                                    TAKE 1/2           



     TABLET BY TABLET BY                                    TO 1           



     MOUTH EVERY MOUTH EVERY                                    TABLET BY       

    



     NIGHT AT NIGHT AT                                    MOUTH           



     BEDTIME AS BEDTIME AS                                    EVERY           



     NEEDED NEEDED                                    NIGHT AT           



                                                  BEDTIME AS           



                                                  NEEDED           

 

     doxycycline doxycycline           No             1capsul BID  doxycyclin   

        [facility]



     hyclate 100 hyclate 100                          e(s)      e hyclate       

    Medical



     mg capsule mg capsule                                    100 mg           



     Take 1 Take 1                                    capsule           



     capsule capsule                                    Take 1           



     twice a day twice a day                                    capsule         

  



     by oral by oral                                    twice a           



     route. route.                                    day by           



                                                  oral           



                                                  route.           

 

     estradiol estradiol           No                       estradiol           

[facility]



     10 mcg 10 mcg                                    10 mcg           Medical



     vaginal vaginal                                    vaginal           



     tablet 1 tablet 1                                    tablet 1           



     tab nightly tab nightly                                    tab            



     x 2 weeks, x 2 weeks,                                    nightly x         

  



     then twice then twice                                    2 weeks,          

 



     weekly weekly                                    then twice           



                                                  weekly           

 

     Evamist Evamist           No                       Evamist           [facility]



     1.53 1.53                                    1.53           Medical



     mg/spray mg/spray                                    mg/spray           



     (1.7 %) (1.7 %)                                    (1.7 %)           



     transdermal transdermal                                    transderma      

     



     spray JONATHAN 1 spray JONATHAN 1                                    l spray         

  



     SPR TO SPR TO                                    JONATHAN 1 SPR           



     INNER INNER                                    TO INNER           



     SURFACE OF SURFACE OF                                    SURFACE OF        

   



     FOREARM D FOREARM D                                    FOREARM D           

 

     fluticasone fluticasone           No                       fluticason      

     [facility]



     propionate propionate                                    e              Med

ical



     50   50                                      propionate           



     mcg/actuati mcg/actuati                                    50             



     on nasal on nasal                                    mcg/actuat           



     spray,suspe spray,suspe                                    ion nasal       

    



     nsion SHAKE nsion SHAKE                                    spray,susp      

     



     LQ AND U 2 LQ AND U 2                                    ension           



     SPRAYS IEN SPRAYS IEN                                    SHAKE LQ          

 



     QD   QD                                      AND U 2           



                                                  SPRAYS IEN           



                                                  QD             

 

     Medrol Medrol           No                       Medrol           [facility]



     (Fritz) 4 mg (Fritz) 4 mg                                    (Fritz) 4 mg        

   [facility]



     tablets in tablets in                                    tablets in        

   



     a dose pack a dose pack                                    a dose          

 



     Take as Take as                                    pack Take           



     directed directed                                    as             



                                                  directed           

 

     montelukast montelukast           No                       montelukas      

     [facility]



     10 mg 10 mg                                    t 10 mg           Medical



     tablet TAKE tablet TAKE                                    tablet          

 



     1 TABLET BY 1 TABLET BY                                    TAKE 1          

 



     MOUTH AT MOUTH AT                                    TABLET BY           



     BEDTIME FOR BEDTIME FOR                                    MOUTH AT        

   



     ASTHMA OR ASTHMA OR                                    BEDTIME           



     ALLERGIES ALLERGIES                                    FOR ASTHMA          

 



                                                  OR             



                                                  ALLERGIES           

 

     olmesartan olmesartan           No                       olmesartan        

   [facility]



     20 mg 20 mg                                    20 mg           Medical



     tablet TAKE tablet TAKE                                    tablet          

 



     1 TABLET BY 1 TABLET BY                                    TAKE 1          

 



     MOUTH EVERY MOUTH EVERY                                    TABLET BY       

    



     DAY  DAY                                     MOUTH           



                                                  EVERY DAY           

 

     Pepcid 20 Pepcid 20           No             1    Q1D  Pepcid 20           

[facility]



     mg tablet mg tablet                                    mg tablet           

Medical



     Take 1 Take 1                                    Take 1           



     tablet tablet                                    tablet           



     every day every day                                    every day           



     by oral by oral                                    by oral           



     route. route.                                    route.           

 

     Proventil Proventil           No                       Proventil           

[facility]



     HFA 90 HFA 90                                    HFA 90           Medical



     mcg/actuati mcg/actuati                                    mcg/actuat      

     



     on aerosol on aerosol                                    ion            



     inhaler inhaler                                    aerosol           



     INHALE 2 INHALE 2                                    inhaler           



     PUFFS BY PUFFS BY                                    INHALE 2           



     MOUTH EVERY MOUTH EVERY                                    PUFFS BY        

   



     4 TO 6 4 TO 6                                    MOUTH           



     HOURS AS HOURS AS                                    EVERY 4 TO           



     NEEDED FOR NEEDED FOR                                    6 HOURS AS        

   



     WHEEZING OR WHEEZING OR                                    NEEDED FOR      

     



     SHORTNESS SHORTNESS                                    WHEEZING           



     OF BREATH OF BREATH                                    OR             



                                                  SHORTNESS           



                                                  OF BREATH           

 

     Voltaren 1 Voltaren 1           No                       Voltaren 1        

   [facility]



     % topical % topical                                    % topical           

Medical



     gel JONATHAN 2 gel JONATHAN 2                                    gel JONATHAN 2           



     GRAMS EXT GRAMS EXT                                    GRAMS EXT           



     AA QID P OR AA QID P OR                                    AA QID P        

   



     INFLAMMATIO INFLAMMATIO                                    OR             



     N    N                                       INFLAMMATI           



                                                  ON             

 

     Xiidra 5 % Xiidra 5 %           No                       Xiidra 5 %        

   [facility]



     eye drops eye drops                                    eye drops           

Medical



     in a in a                                    in a           



     dropperette dropperette                                    dropperett      

     



     INSTILL 1 INSTILL 1                                    e INSTILL           



     DROP BOTH DROP BOTH                                    1 DROP           



     EYES TWICE EYES TWICE                                    BOTH EYES         

  



     DAILY DAILY                                    TWICE           



                                                  DAILY           

 

     Asmanex HFA Asmanex HFA           No                       Asmanex         

  [facility]



     100  100                                                Medical



     mcg/actuati mcg/actuati                                    mcg/actuat      

     



     on aerosol on aerosol                                    ion            



     inhaler inhaler                                    aerosol           



     INHALE 2 INHALE 2                                    inhaler           



     PUFFS BY PUFFS BY                                    INHALE 2           



     MOUTH TWICE MOUTH TWICE                                    PUFFS BY        

   



     DAILY DAILY                                    MOUTH           



                                                  TWICE           



                                                  DAILY           

 

     Bepreve 1.5 Bepreve 1.5           No                       Bepreve         

  [facility]



     % eye drops % eye drops                                    1.5 % eye       

    Medical



     INSTILL 1 INSTILL 1                                    drops           



     DROP INTO DROP INTO                                    INSTILL 1           



     BOTH EYES BOTH EYES                                    DROP INTO           



     BID  BID                                     BOTH EYES           



                                                  BID            

 

     cyclobenzap cyclobenzap           No                       cyclobenza      

     [facility]



     rine 10 mg rine 10 mg                                    prine 10          

 Medical



     tablet TAKE tablet TAKE                                    mg tablet       

    



     1/2 TO 1 1/2 TO 1                                    TAKE 1/2           



     TABLET BY TABLET BY                                    TO 1           



     MOUTH EVERY MOUTH EVERY                                    TABLET BY       

    



     NIGHT AT NIGHT AT                                    MOUTH           



     BEDTIME AS BEDTIME AS                                    EVERY           



     NEEDED NEEDED                                    NIGHT AT           



                                                  BEDTIME AS           



                                                  NEEDED           

 

     doxycycline doxycycline           No             1capsul BID  doxycyclin   

        [facility]



     hyclate 100 hyclate 100                          e(s)      e hyclate       

    [facility]



     mg capsule mg capsule                                    100 mg           



     Take 1 Take 1                                    capsule           



     capsule capsule                                    Take 1           



     twice a day twice a day                                    capsule         

  



     by oral by oral                                    twice a           



     route. route.                                    day by           



                                                  oral           



                                                  route.           

 

     estradiol estradiol           No                       estradiol           

[facility]



     10 mcg 10 mcg                                    10 mcg           Medical



     vaginal vaginal                                    vaginal           



     tablet 1 tablet 1                                    tablet 1           



     tab nightly tab nightly                                    tab            



     x 2 weeks, x 2 weeks,                                    nightly x         

  



     then twice then twice                                    2 weeks,          

 



     weekly weekly                                    then twice           



                                                  weekly           

 

     Evamist Evamist           No                       Evamist           [facility]



     1.53 1.53                                    1.53           Medical



     mg/spray mg/spray                                    mg/spray           



     (1.7 %) (1.7 %)                                    (1.7 %)           



     transdermal transdermal                                    transderma      

     



     spray JONATHAN 1 spray JONATHAN 1                                    l spray         

  



     SPR TO SPR TO                                    JONATHAN 1 SPR           



     INNER INNER                                    TO INNER           



     SURFACE OF SURFACE OF                                    SURFACE OF        

   



     FOREARM D FOREARM D                                    FOREARM D           

 

     fluticasone fluticasone           No                       fluticason      

     [facility]



     propionate propionate                                    e              Med

ical



     50   50                                      propionate           



     mcg/actuati mcg/actuati                                    50             



     on nasal on nasal                                    mcg/actuat           



     spray,suspe spray,suspe                                    ion nasal       

    



     nsion SHAKE nsion SHAKE                                    spray,susp      

     



     LQ AND U 2 LQ AND U 2                                    ension           



     SPRAYS IEN SPRAYS IEN                                    SHAKE LQ          

 



     QD   QD                                      AND U 2           



                                                  SPRAYS IEN           



                                                  QD             

 

     Medrol Medrol           No                       Medrol           [facility]



     (Fritz) 4 mg (Fritz) 4 mg                                    (Fritz) 4 mg        

   [facility]



     tablets in tablets in                                    tablets in        

   



     a dose pack a dose pack                                    a dose          

 



     Take as Take as                                    pack Take           



     directed directed                                    as             



                                                  directed           

 

     montelukast montelukast           No                       montelukas      

     [facility]



     10 mg 10 mg                                    t 10 mg           Medical



     tablet TAKE tablet TAKE                                    tablet          

 



     1 TABLET BY 1 TABLET BY                                    TAKE 1          

 



     MOUTH AT MOUTH AT                                    TABLET BY           



     BEDTIME FOR BEDTIME FOR                                    MOUTH AT        

   



     ASTHMA OR ASTHMA OR                                    BEDTIME           



     ALLERGIES ALLERGIES                                    FOR ASTHMA          

 



                                                  OR             



                                                  ALLERGIES           

 

     olmesartan olmesartan           No                       olmesartan        

   [facility]



     20 mg 20 mg                                    20 mg           Medical



     tablet TAKE tablet TAKE                                    tablet          

 



     1 TABLET BY 1 TABLET BY                                    TAKE 1          

 



     MOUTH EVERY MOUTH EVERY                                    TABLET BY       

    



     DAY  DAY                                     MOUTH           



                                                  EVERY DAY           

 

     Pepcid 20 Pepcid 20           No             1    Q1D  Pepcid 20           

[facility]



     mg tablet mg tablet                                    mg tablet           

Medical



     Take 1 Take 1                                    Take 1           



     tablet tablet                                    tablet           



     every day every day                                    every day           



     by oral by oral                                    by oral           



     route. route.                                    route.           

 

     Proventil Proventil           No                       Proventil           

[facility]



     HFA 90 HFA 90                                    HFA 90           Medical



     mcg/actuati mcg/actuati                                    mcg/actuat      

     



     on aerosol on aerosol                                    ion            



     inhaler inhaler                                    aerosol           



     INHALE 2 INHALE 2                                    inhaler           



     PUFFS BY PUFFS BY                                    INHALE 2           



     MOUTH EVERY MOUTH EVERY                                    PUFFS BY        

   



     4 TO 6 4 TO 6                                    MOUTH           



     HOURS AS HOURS AS                                    EVERY 4 TO           



     NEEDED FOR NEEDED FOR                                    6 HOURS AS        

   



     WHEEZING OR WHEEZING OR                                    NEEDED FOR      

     



     SHORTNESS SHORTNESS                                    WHEEZING           



     OF BREATH OF BREATH                                    OR             



                                                  SHORTNESS           



                                                  OF BREATH           

 

     Voltaren 1 Voltaren 1           No                       Voltaren 1        

   [facility]



     % topical % topical                                    % topical           

Medical



     gel JONATHAN 2 gel JONATHAN 2                                    gel JONATHAN 2           



     GRAMS EXT GRAMS EXT                                    GRAMS EXT           



     AA QID P OR AA QID P OR                                    AA QID P        

   



     INFLAMMATIO INFLAMMATIO                                    OR             



     N    N                                       INFLAMMATI           



                                                  ON             

 

     Xiidra 5 % Xiidra 5 %           No                       Xiidra 5 %        

   [facility]



     eye drops eye drops                                    eye drops           

Medical



     in a in a                                    in a           



     dropperette dropperette                                    dropperett      

     



     INSTILL 1 INSTILL 1                                    e INSTILL           



     DROP BOTH DROP BOTH                                    1 DROP           



     EYES TWICE EYES TWICE                                    BOTH EYES         

  



     DAILY DAILY                                    TWICE           



                                                  DAILY           







Immunizations







           Ordered Immunization Filled Immunization Date       Status     Commen

ts   Source



           Name       Name                                        

 

           COVID-19 vaccine, COVID-19 vaccine, 2021 Completed             

[facility]



           vector-nr, rS-Ad26, vector-nr, rS-Ad26, 00:00:00                     

    



           PF, 0.5 mL PF, 0.5 mL                                  

 

           COVID-19 vaccine, COVID-19 vaccine, 2021 Completed             

[facility]



           vector-nr, rS-Ad26, vector-nr, rS-Ad26, 00:00:00                     

    



           PF, 0.5 mL PF, 0.5 mL                                  

 

           COVID-19 vaccine, COVID-19 vaccine, 2021 Completed             

[facility]



           vector-nr, rS-Ad26, vector-nr, rS-Ad26, 00:00:00                     

    



           PF, 0.5 mL PF, 0.5 mL                                  

 

           influenza, influenza, 2019 Completed             [facility]



           injectable, injectable, 12:17:02                         



           quadrivalent quadrivalent                                  

 

           influenza, influenza, 2019 Completed             [facility]



           injectable, injectable, 12:17:02                         



           quadrivalent quadrivalent                                  

 

           influenza, influenza, 2019 Completed             [facility]



           injectable, injectable, 12:17:02                         



           quadrivalent quadrivalent                                  

 

           influenza, influenza, 2018 Completed             [facility]



           injectable, injectable, 10:13:00                         



           quadrivalent quadrivalent                                  

 

           influenza, influenza, 2018 Completed             [facility]



           injectable, injectable, 10:13:00                         



           quadrivalent quadrivalent                                  

 

           influenza, influenza, 2018 Completed             [facility]



           injectable, injectable, 10:13:00                         



           quadrivalent quadrivalent                                  







Vital Signs







             Vital Name   Observation Time Observation Value Comments     Source

 

             BP Diastolic 2021 00:00:00 68 mm[Hg]                 [facility]

 

             Height       2021 00:00:00 61.75 [in_i]              Eryn SHERMAN

edical

 

             BMI (Body Mass Index) 2021 00:00:00 27.1 kg/m2               

 [facility]

 

             BP Systolic  2021 00:00:00 118 mm[Hg]                rEyn SHERMAN

edical

 

             Body Weight  2021 00:00:00 2355.2 [oz_av]              Eryn

 [facility]

 

             BP Diastolic 2021 00:00:00 64 mm[Hg]                 Eryn SHERMAN

edical

 

             Height       2021 00:00:00 61.75 [in_i]              Eryn SHERMAN

edical

 

             BP Systolic  2021 00:00:00 124 mm[Hg]                Eryn SHERMAN

edical

 

             Heart Rate   2018-10-03 22:32:00                           [facility]

 [place]

 

             Respitory Rate 2018-10-03 22:32:00                           [facility]

al [place]

 

             Systolic (mm Hg) 2018-10-03 22:32:00                           [place]

[facility] [place]

 

             Diastolic (mm Hg) 2018-10-03 22:32:00                           [facility]

[facility] [place]

 

             Temperature Oral (F) 2018-10-03 22:32:00 97.0 F                    

[facility] [place] [facility]

 

             Heart Rate   2018-10-03 20:33:00                           [facility]

 [place]

 

             Respitory Rate 2018-10-03 20:33:00                           [facility]

al [place]

 

             Systolic (mm Hg) 2018-10-03 20:33:00                           [place]

[facility] [place]

 

             Diastolic (mm Hg) 2018-10-03 20:33:00                           [facility]

[facility] [place]

 

             Temperature Oral (F) 2018-10-03 20:33:00 97.8 F                    

[facility]

 

             Temperature Oral (F) 2018-10-03 17:00:00 97.8 F                    

[facility]

 

             Heart Rate   2018-10-03 17:00:00                           [facility]

 [place]

 

             Respitory Rate 2018-10-03 17:00:00                           [facility]

al [place]

 

             Systolic (mm Hg) 2018-10-03 17:00:00                           [place]

[facility] [place]

 

             Diastolic (mm Hg) 2018-10-03 17:00:00                           [facility]

[facility] [place]

 

             BMI Calculated 2018-10-02 12:55:00                           [facility]

[facility] [place]

 

             Weight       2018-10-02 12:55:00                           [facility]

 [place]

 

             Height       2018-10-01 12:55:00 157.48 cm                 [facility]







Procedures







                Procedure       Date / Time Performed Performing Clinician [facility]

e

 

                Laparoscopic sleeve 2018-10-02 05:00:00                 [facility]

 [place]



                gastrectomy                                     

 

                Hernia Repair   2018-10-02 00:00:00                 [facility]

al

 

                Bariatric Surgery: 2018-10-02 00:00:00                 [facility]

[facility]



                Gastric Sleeve                                  

 

                Low Back Disk Surgery 2015 00:00:00                 [facility]

 [facility]

 

                Lumbar Spine Surgery 2015 00:00:00                 [facility] 

[facility]

 

                Colonoscopy     2015 00:00:00                 [facility]

al

 

                Vaginal Hysterectomy 2013 00:00:00                 [facility] 

[facility]

 

                Hysterectomy (Ovaries 2003 00:00:00                 [facility]

 [facility]



                Remain)                                         

 

                Carpal Tunnel Surgery 1997 00:00:00                 [facility]

 [facility]

 

                                                        [facility]



                section<sup>1</sup>                                 

 

                Dilation and curettage                                 [facility]

 

                H/O: hysterectomy                                 [facility]

nn

 

                History of lumbar                                 [facility]

nn



                laminectomy                                     







Plan of Care







             Planned Activity Planned Date Details      Comments     Source

 

             Future Scheduled 2022   COVID-19 VACCINE (#1)              [facility]



             Test         13:50:42     [code = COVID-19              



                                       VACCINE (#1)]              

 

             Future Scheduled 2022   Screening for              [facility]



             Test         13:50:42     malignant neoplasm of              



                                       cervix (procedure)              



                                       [code = 840341065]              

 

             Future Scheduled 2022   BREAST CANCER              [facility]



             Test         13:50:42     SCREENING [code =              



                                       BREAST CANCER              



                                       SCREENING]                

 

             Future Scheduled 2022   COLONOSCOPY SCREENING              [facility]



             Test         13:50:42     [code = COLONOSCOPY              



                                       SCREENING]                

 

             Future Scheduled 2022   SHINGLES VACCINES (1              Met

[facility]



             Test         13:50:42     of 2) [code =              



                                       SHINGLES VACCINES (1              



                                       of 2)]                    

 

             Future Scheduled 2022   INFLUENZA VACCINE              [facility]

[facility]



             Test         13:50:42     [code = INFLUENZA              



                                       VACCINE]                  

 

             Diagnostic Test 2021   urinalysis, dipstick              [facility]



             Pending      00:00:00     [code = urinalysis,              



                                       dipstick]                 







Encounters







        Start   End     Encounter Admission Attending Care    Care    Encounter 

Source



        Date/Time Date/Time Type    Type    Clinicians Facility Department ID   

   

 

        2022-10-17         Outpatient                 [facility]  [id number]

 [facility]



        07:20:00                                                 [id number]  [facility]



                                                                        Clinics

 

        2022         Outpatient                 [facility]  [id number]

 [facility]



        16:01:00                                                 [id number]  [facility]



                                                                        Clinics

 

        2022         Outpatient                 [facility]  [id number]

 [facility]



        14:33:12                                                 [id number]  [facility]



                                                                        [facility]



                                                                        [facility]



                                                                        [facility]



                                                                        Clinics

 

        2021-10-25         Outpatient                 CARA MARROQUIN [id number]

68 [facility]



        16:49:19                                                 [id number] [facility]

 

        2022 Outpatient         GC_PANTHER_ PRIV    PRIV    148

[id number] [facility]



        09:38:00 09:38:00                 Patel_G                 [id number] [facility]

l

 

        2022-01-10 2022-01-10 Office                  [facility]  [facility]  [id number] C

[facility]



        00:00:00 00:00:00 Visit, Est                                         Emily

es -



                        Pt., Level                                         St



                        1                                               [facility]



                                                                        ent



                                                                        Clinics

 

        2021-10-01 2021-10-01 Outpatient MENDOZA MCNULTY  ZAK ZAK [id number]

[id number] [facility]



        07:27:00 07:27:00                 GINGER                  [id number] [facility]

 

        2021 Outpatient         GC_PANTHER_ PRIV    PRIV    148

[id number] [facility]



        02:12:00 02:12:00                 Still_D                 [id number] [facility]

l

 

        2021 Outpatient         GC_PANTHER_ PRIV    PRIV    148

[id number] [facility]



        03:43:00 03:43:00                 Still_D                 [id number] [facility]

l

 

        2021 Outpatient         GC_PANTHER_ PRIV    PRIV    148

[id number] [facility]



        01:12:00 01:12:00                 Still_D                 [id number] [facility]

l

 

        2021 Outpatient         GC_TBIC_WP_ PRIV    PRIV    148

[id number] [facility]



        04:50:00 04:50:00                 Mammo                   [id number] [facility]

l

 

        2021 Outpatient         Mcnulty,  PRIV    PRIV    [id number]

[id number] 



        00:00:00 00:00:00                 Ginger                  [id number] 



                                                                [id number] 



                                                                [id number]  

 

        2021 Ginger                  PRIV    [facility] 

27 [facility]



        00:00:00 00:00:00 DANIEL Mcnulty:                         [facility]

dical



                        [address]                         GC_PANTHER_         



                        [facility]                         [facility],                          [address]           



                        [address],                         Office*         



                        [place], [place]                                         



                        [address]                                         



                        , Ph. [phone]                                         



                        [phone]                                         

 

        2021 Outpatient         GC_PANTHER_ PRIV    PRIV    148

[id number] [facility]



        04:59:00 04:59:00                 Still_D                 [id number] [facility]

l

 

        2021 Outpatient         Kimberly Christensen PRIV    PRIV    192a

[id number] 



        00:00:00 00:00:00                                         [id number] 



                                                                [id number] 



                                                                [id number]  

 

        2021 Kimberly Christensen,                 PRIV    [facility]

[id number] [facility]



        00:00:00 00:00:00 NATHALY,                           [facility]         Medic

al



                        FNP-C:                          GC_PANTHER_         



                        [address]                            [address]                         [facility] ,                         Office          



                        [address],                                         



                        [place], [place]                                              



                        [address]                                         



                        , Ph.                                           



                        [phone]                                           



                        [phone]                                         

 

        2021 Outpatient         Kimberly Christensen PRIV    PRIV    1938

[id number] 



        00:00:00 00:00:00                                         [id number] 



                                                                [id number] 



                                                                [id number]  

 

        2021-04-15 2021-04-15 Outpatient         GC_PANTHER_ PRIV    PRIV    148

[id number] [facility]



        01:04:00 01:04:00                 Still_D                 [id number] [facility]

l

 

        2021 Outpatient         GC_PANTHER_ PRIV    PRIV    148

[id number] [facility]



        12:54:00 12:54:00                 Still_D                 [id number] [facility]

l

 

        2019-10-14 2019 OP Therapy                 [facility] 8541

[id number] [facility]



        12:00:00 05:59:00 Patients                 r       [facility] 00      l



                                                        Ira            [facility]



                                                        [facility]         

 

        2019-10-14 2019 Outpatient         Garcia, Arnoldo [id number]. [id number]

. [id number] 



        07:00:00 23:59:00                 [place]     [id number]      



                                                [id number] 5.15            

 

        2019 2019-10-12 OP Therapy                 [facility] 8541

[id number] [facility]



        12:00:00 04:59:00 Patients                 r       [place] 00      l



                                                        Ira            [facility]



                                                        [facility]         

 

        2019 2019-10-11 Outpatient         Wayne Garciah [id number]. [id number]

. [id number] 



        07:00:00 23:59:00                 [place]     [id number]      



                                                [id number] 5.15            

 

        2018 Outpatient                 [facility]    [id number]

[id number] [facility]



        21:02:00 04:59:00                         r       Outpatient 00      l



                                                        [facility]         

 

        2018 Outpatient         [place] [address]    854

[id number] 



        16:02:00 23:59:00                 Ang E                 Melissa      

 

        2018-10-02 2018-10-03 Inpatient                 [facility] [address]

[id number] [facility]



        12:32:00 22:45:00                         r       [place] The 00      l



                                                        [facility]         

 

        2018-10-02 2018-10-03 Outpatient         KAILEE Cardenas  [facility]  [id number]

[id number] 



        07:32:00 17:45:00                 [place] [address]      







Results







           Test Description Test Time  Test Comments Results    Result Comments 

Source









                          SARS-CoV-2 (COVID-19) RNA [Presence] in Respiratory sp

ecimen by 2021 

00:00:00                                



                    SERAFIN with probe detection                     









                      Test Item  Value      Reference Range Interpretation Comme

nts









             SARS-CoV-2 (COVID-19) RNA [Presence] in Respiratory not detected no

t detected              



             specimen by SERAFIN with probe detection (test code =                  

                      



             92529-2)                                            



[facility] + [facility] and SARS CoV + SARS CoV 2 Ag panel, 
[place], upper respiratory [id number] 16:53:00





             Test Item    Value        Reference Range Interpretation Comments

 

             Flu A (test code = Flu A) negative                               

 

             Flu B (test code = Flu B) negative                               

 

             SARS-CoV-2 (test code = SARS-CoV-2) negative                       

        

 

             Control (test code = Control) Valid                                

  



[facility] + B and SARS CoV + SARS CoV 2 Ag panel, 
[place], upper respiratory [id number] 16:53:00





             Test Item    Value        Reference Range Interpretation Comments

 

             Flu A (test code = Flu A) negative                               

 

             Flu B (test code = Flu B) negative                               

 

             SARS-CoV-2 (test code = SARS-CoV-2) negative                       

        

 

             Control (test code = Control) Valid                                

  



[id number]





             Test Item    Value        Reference Range Interpretation Comments

 

             Segs (test code = Segs) 89.7         45.0-75.0                 



[facility]2018-10-03 09:18:00





             Test Item    Value        Reference Range Interpretation Comments

 

             Lymphocytes (test code = Lymphocytes) 6.4          20.0-40.0       

          



[facility]2018-10-03 09:18:00





             Test Item    Value        Reference Range Interpretation Comments

 

             Monocytes (test code = Monocytes) 3.6          2.0-12.0            

      



[facility]2018-10-03 09:18:00





             Test Item    Value        Reference Range Interpretation Comments

 

             RDW (test code = RDW) 14.4         11.5-14.5                 



[facility]2018-10-03 09:18:00





             Test Item    Value        Reference Range Interpretation Comments

 

             WBC (test code = WBC) 9.8          3.7-10.4                  



[facility]2018-10-03 09:18:00





             Test Item    Value        Reference Range Interpretation Comments

 

             RBC (test code = RBC) 4.19         4.20-5.40                 



[facility]2018-10-03 09:18:00





             Test Item    Value        Reference Range Interpretation Comments

 

             Hgb (test code = Hgb) 12.7         12.0-16.0                 



[facility]2018-10-03 09:18:00





             Test Item    Value        Reference Range Interpretation Comments

 

             Hct (test code = Hct) 36.9         36.0-48.0                 



[facility]2018-10-03 09:18:00





             Test Item    Value        Reference Range Interpretation Comments

 

             MCV (test code = MCV) 88.3         80.0-98.0                 



[facility]2018-10-03 09:18:00





             Test Item    Value        Reference Range Interpretation Comments

 

             MCH (test code = MCH) 30.4 pg      27.0-31.0                 



[facility]2018-10-03 09:18:00





             Test Item    Value        Reference Range Interpretation Comments

 

             MCHC (test code = MCHC) 34.5         32.0-36.0                 



[facility]2018-10-03 09:18:00





             Test Item    Value        Reference Range Interpretation Comments

 

             Platelet (test code = Platelet) 256          133-450               

    



[facility]2018-10-03 09:18:00





             Test Item    Value        Reference Range Interpretation Comments

 

             MPV (test code = MPV) 8.1          7.4-10.4                  



[facility]2018-10-03 09:18:00





             Test Item    Value        Reference Range Interpretation Comments

 

             Monocytes # (test code 0.3          See_Comment                [Aut

omated message] The



             = Monocytes #)                                        system which 

generated



                                                                 this result tra

nsmitted



                                                                 reference range

: <=0.8.



                                                                 The reference r

betsey was



                                                                 not used to int

erpret



                                                                 this result as



                                                                 normal/abnormal

.



[id number] 09:18:00





             Test Item    Value        Reference Range Interpretation Comments

 

             Basophils (test code = 0.3          See_Comment                [Aut

omated message] The



             Basophils)                                          system which ge

nerated



                                                                 this result tra

nsmitted



                                                                 reference range

: <=1.0.



                                                                 The reference r

betsey was



                                                                 not used to int

erpret



                                                                 this result as



                                                                 normal/abnormal

.



[facility] [id number] 09:18:00





             Test Item    Value        Reference Range Interpretation Comments

 

             Neutrophils # (test code = Neutrophils 8.8          1.5-8.1        

           



             #)                                                  



[facility]2018-10-03 09:18:00





             Test Item    Value        Reference Range Interpretation Comments

 

             Lymphocytes # (test code = Lymphocytes 0.6          1.0-5.5        

           



             #)                                                  



[facility]2018-09-27 22:21:00





             Test Item    Value        Reference Range Interpretation Comments

 

             eGFR (test code = eGFR) 100                                    



[facility]2018-09-27 22:21:00





             Test Item    Value        Reference Range Interpretation Comments

 

             BUN (test code = BUN) 21           7-22                      



[facility]2018-09-27 22:21:00





             Test Item    Value        Reference Range Interpretation Comments

 

             Glucose Lvl (test code = Glucose Lvl) 83           70-99           

          



[facility]2018-09-27 22:21:00





             Test Item    Value        Reference Range Interpretation Comments

 

             Creatinine Lvl (test code = Creatinine 0.66         0.50-1.40      

           



             Lvl)                                                



[facility]2018-09-27 22:21:00





             Test Item    Value        Reference Range Interpretation Comments

 

             Sodium Lvl (test code = Sodium Lvl) 138          135-145           

        



[facility]2018-09-27 22:21:00





             Test Item    Value        Reference Range Interpretation Comments

 

             Potassium Lvl (test code = Potassium 3.1          3.5-5.1          

         



             Lvl)                                                



[facility]2018-09-27 22:21:00





             Test Item    Value        Reference Range Interpretation Comments

 

             Chloride Lvl (test code = Chloride Lvl) 99                   

            



[facility]2018-09-27 22:21:00





             Test Item    Value        Reference Range Interpretation Comments

 

             Calcium Lvl (test code = Calcium Lvl) 9.6          8.5-10.5        

          



[facility]2018-09-27 22:21:00





             Test Item    Value        Reference Range Interpretation Comments

 

             CO2 (test code = CO2) 30           24-32                     



[facility]2018-09-27 22:21:00





             Test Item    Value        Reference Range Interpretation Comments

 

             AGAP (test code = AGAP) 12.1         10.0-20.0                 



[id number] 22:21:00





             Test Item    Value        Reference Range Interpretation Comments

 

             RBC (test code = RBC) 4.41         4.20-5.40                 



[facility] CenterFsleaeaCTCSTTFSJU3906-25-86 22:21:00





             Test Item    Value        Reference Range Interpretation Comments

 

             WBC (test code = WBC) 7.1          3.7-10.4                  



[facility] CenterPvjdbrgNXWEGLZFVN5895-34-07 22:21:00





             Test Item    Value        Reference Range Interpretation Comments

 

             Hct (test code = Hct) 38.0         36.0-48.0                 



[facility] CenterAomilztSWDKZFPQXQ6124-70-79 22:21:00





             Test Item    Value        Reference Range Interpretation Comments

 

             MCHC (test code = MCHC) 34.5         32.0-36.0                 



[facility] CenterAqfzmfeXYSUWVGVSX9784-38-38 22:21:00





             Test Item    Value        Reference Range Interpretation Comments

 

             MCH (test code = MCH) 29.8 pg      27.0-31.0                 



[facility] CenterQiapygdEEEJMQHLMJ9080-84-12 22:21:00





             Test Item    Value        Reference Range Interpretation Comments

 

             Hgb (test code = Hgb) 13.1         12.0-16.0                 



[facility] CenterYhthseiIHOTFIRYKH5197-56-21 22:21:00





             Test Item    Value        Reference Range Interpretation Comments

 

             MPV (test code = MPV) 8.2          7.4-10.4                  



[facility] CenterQpeznpmLJBUMYNTYK1808-27-00 22:21:00





             Test Item    Value        Reference Range Interpretation Comments

 

             Platelet (test code = Platelet) 284          133-450               

    



[place] 623038-09-27 22:21:00





             Test Item    Value        Reference Range Interpretation Comments

 

             RDW (test code = RDW) 14.2         11.5-14.5                 



[facility] CenterHhfrutbVXVABWOQYD9953-41-51 22:21:00





             Test Item    Value        Reference Range Interpretation Comments

 

             MCV (test code = MCV) 86.3         80.0-98.0                 



[facility] CenterSdxnekxBNYFKQGQMF0119-69-76 22:21:00





             Test Item    Value        Reference Range Interpretation Comments

 

             Monocytes # (test code 0.7          See_Comment                [Aut

omated message] The



             = Monocytes #)                                        system which 

generated



                                                                 this result tra

nsmitted



                                                                 reference range

: <=0.8.



                                                                 The reference r

betsey was



                                                                 not used to int

erpret



                                                                 this result as



                                                                 normal/abnormal

.



[facility] CenterIxkdztvCHHAAYBBVD0961-50-69 22:21:00





             Test Item    Value        Reference Range Interpretation Comments

 

             Neutrophils # (test code = Neutrophils 4.0          1.5-8.1        

           



             #)                                                  



[facility] CenterVfdxarfBCUVGDSLXO7539-83-00 22:21:00





             Test Item    Value        Reference Range Interpretation Comments

 

             Lymphocytes # (test code = Lymphocytes 2.4          1.0-5.5        

           



             #)                                                  



[facility] CenterRukkhqcZEIRFSTKTB7778-83-00 22:21:00





             Test Item    Value        Reference Range Interpretation Comments

 

             Eosinophils (test code = 1.0          See_Comment                [A

utomated message] The



             Eosinophils)                                        system which ge

nerated



                                                                 this result tra

nsmitted



                                                                 reference range

: <=4.0.



                                                                 The reference r

betsey was



                                                                 not used to int

erpret



                                                                 this result as



                                                                 normal/abnormal

.



[facility] CenterSmlqciuGRTAPTDIXV4992-84-54 22:21:00





             Test Item    Value        Reference Range Interpretation Comments

 

             Basophils (test code = 0.5          See_Comment                [Aut

omated message] The



             Basophils)                                          system which ge

nerated



                                                                 this result tra

nsmitted



                                                                 reference range

: <=1.0.



                                                                 The reference r

betsey was



                                                                 not used to int

erpret



                                                                 this result as



                                                                 normal/abnormal

.



[facility] CenterKajkfoaWJFJAGTVTE4422-38-42 22:21:00





             Test Item    Value        Reference Range Interpretation Comments

 

             Segs (test code = Segs) 55.9         45.0-75.0                 



[facility] CenterJjdtyisUIJPNFHSDF7269-53-40 22:21:00





             Test Item    Value        Reference Range Interpretation Comments

 

             Monocytes (test code = Monocytes) 9.7          2.0-12.0            

      



[facility] CenterMrmnhafWDJZXPWHIN9853-34-50 22:21:00





             Test Item    Value        Reference Range Interpretation Comments

 

             Lymphocytes (test code = Lymphocytes) 32.9         20.0-40.0       

          



[facility]





             Test Item    Value        Reference Range Interpretation Comments

 

             Eosinophils # (test code 0.1          See_Comment                [A

utomated message] The



             = Eosinophils #)                                        system [employer] generated



                                                                 this result tra

nsmitted



                                                                 reference range

: <=0.5.



                                                                 The reference r

betsey was



                                                                 not used to int

erpret



                                                                 this result as



                                                                 normal/abnormal

.



[facility]

## 2023-02-13 NOTE — EDPHYS
Physician Documentation                                                                           

 [facility]                                                                 

Name: Adrianne Kamara                                                                              

Age: 58 yrs                                                                                       

Sex: Female                                                                                       

: 1965                                                                                   

MRN: [id number]                                                                                   

Arrival Date: 2023                                                                          

Time: 21:03                                                                                       

Account#: [id number]                                                                            

Bed 20                                                                                            

Private MD:                                                                                       

ED Physician Maycol Marcial                                                                         

HPI:                                                                                              

                                                                                             

21:18 This 58 yrs old Female presents to ER via Unassigned with complaints of Abdominal Pain, snw 

      Vomiting.                                                                                   

21:18 The patient presents with abdominal pain in the periumbilical area. Onset: The          snw 

      symptoms/episode began/occurred suddenly, at 18:10, and became persistent. The symptoms     

      do not radiate. The symptoms are described as shooting, steady. Severity of pain: At        

      its worst the pain was moderate in the emergency department the pain is unchanged. The      

      patient has not experienced similar symptoms in the past. The patient has not recently      

      seen a physician. hx of htn, has had hyst, gastric sleeve, laminectomy L5, bilateral        

      carpal tunnel.                                                                              

                                                                                                  

Historical:                                                                                       

- Allergies:                                                                                      

21:19 Codeine;                                                                                ll3 

- Home Meds:                                                                                      

21:19 montelukast oral [Active];                                                              ll3 

- PMHx:                                                                                           

21:19 Asthma; Hypertensive disorder;                                                          ll3 

- PSHx:                                                                                           

21:19 Total abdominal hysterectomy; Gastric sleeve;                                           ll3 

                                                                                                  

- Immunization history:: Client reports receiving the 2nd dose of the Covid vaccine.              

- Social history:: Smoking status: Patient/guardian denies using tobacco.                         

                                                                                                  

                                                                                                  

ROS:                                                                                              

21:17 Constitutional: Negative for fever, chills, and weight loss, Eyes: Negative for injury, snw 

      pain, redness, and discharge, ENT: Negative for injury, pain, and discharge, Neck:          

      Negative for injury, pain, and swelling, Cardiovascular: Negative for chest pain,           

      palpitations, and edema, Respiratory: Negative for shortness of breath, cough,              

      wheezing, and pleuritic chest pain, Back: Negative for injury and pain, : Negative        

      for injury, bleeding, discharge, and swelling, MS/Extremity: Negative for injury and        

      deformity, Skin: Negative for injury, rash, and discoloration, Neuro: Negative for          

      headache, weakness, numbness, tingling, and seizure, Psych: Negative for depression,        

      anxiety, suicide ideation, homicidal ideation, and hallucinations.                          

21:17 Abdomen/GI: Positive for abdominal pain, nausea, vomiting, did enema prior to arrival -     

      small amount of stool resulted.                                                             

                                                                                                  

Exam:                                                                                             

21:17 Constitutional:  This is a well developed, well nourished patient who is awake, alert,  snw 

      and in no acute distress. Head/Face:  Normocephalic, atraumatic. Eyes:  Pupils equal        

      round and reactive to light, extra-ocular motions intact.  Lids and lashes normal.          

      Conjunctiva and sclera are non-icteric and not injected.  Cornea within normal limits.      

      Periorbital areas with no swelling, redness, or edema. ENT:  Nares patent. No nasal         

      discharge, no septal abnormalities noted.  Tympanic membranes are normal and external       

      auditory canals are clear.  Oropharynx with no redness, swelling, or masses, exudates,      

      or evidence of obstruction, uvula midline.  Mucous membranes moist. Neck:  Trachea          

      midline, no thyromegaly or masses palpated, and no cervical lymphadenopathy.  Supple,       

      full range of motion without nuchal rigidity, or vertebral point tenderness.  No            

      Meningismus. Chest/axilla:  Normal chest wall appearance and motion.  Nontender with no     

      deformity.  No lesions are appreciated. Cardiovascular:  Regular rate and rhythm with a     

      normal S1 and S2.  No gallops, murmurs, or rubs.  Normal PMI, no JVD.  No pulse             

      deficits. Respiratory:  Lungs have equal breath sounds bilaterally, clear to                

      auscultation and percussion.  No rales, rhonchi or wheezes noted.  No increased work of     

      breathing, no retractions or nasal flaring. Back:  No spinal tenderness.  No                

      costovertebral tenderness.  Full range of motion. Skin:  Warm, dry with normal turgor.      

      Normal color with no rashes, no lesions, and no evidence of cellulitis. MS/ Extremity:      

      Pulses equal, no cyanosis.  Neurovascular intact.  Full, normal range of motion. Neuro:     

       Awake and alert, GCS 15, oriented to person, place, time, and situation.  Cranial          

      nerves II-XII grossly intact.  Motor strength 5/5 in all extremities.  Sensory grossly      

      intact.  Cerebellar exam normal.  Normal gait. Psych:  Awake, alert, with orientation       

      to person, place and time.  Behavior, mood, and affect are within normal limits.            

21:17 Abdomen/GI: Inspection: abdomen appears normal, Bowel sounds: normal, Palpation:            

      moderate abdominal tenderness, in the umbilical area, right upper quadrant and left         

      upper quadrant, gastric sleeve in 2018.                                                     

                                                                                                  

Vital Signs:                                                                                      

21:09  / 91; Pulse 64; Resp 19 S; Pulse Ox 99% on R/A;                                  ha1 

21:17  / 115; Pulse 63; Resp 18; Temp 97.7(O); Pulse Ox 99% on R/A; Weight 61.69 kg     ll3 

      (R); Height 5 ft. 3 in. (160.02 cm) (R); Pain 8/10;                                         

22:10  / 87; Pulse 69; Resp 18 S; Pulse Ox 100% on R/A;                                 ha1 

22:55  / 87; Pulse 66; Resp 16 S; Pulse Ox 100% on R/A;                                 ha1 

23:30  / 90; Pulse 69; Resp 18 S; Pulse Ox 99% on R/A;                                  ha1 

                                                                                             

00:25  / 89; Pulse 65; Resp 18 S; Pulse Ox 99% on R/A;                                  ha1 

                                                                                             

21:17 Body Mass Index 24.09 (61.69 kg, 160.02 cm)                                             ll3 

                                                                                                  

MDM:                                                                                              

                                                                                             

21:04 Patient medically screened.                                                             snw 

23:37 Differential diagnosis: Cholelithiasis, diverticulitis, gastritis, non-specific abd     snw 

      pain, pancreatitis, gastric sleeve malfunction, ileus, obstruction. Data reviewed:          

      vital signs, nurses notes, lab test result(s), radiologic studies, CT scan. Counseling:     

      I had a detailed discussion with the patient and/or guardian regarding: the historical      

      points, exam findings, and any diagnostic results supporting the discharge/admit            

      diagnosis, the presence of at least one elevated blood pressure reading (>120/80)           

      during this emergency department visit, lab results, radiology results, pt with             

      spasmodic abd pain, will add dicyclomine to simethicone. Special discussion: Based on       

      the patient's Hx, exam, and Dx evaluation, there is no indication for emergent surgery      

      or inpatient Tx. It is understood by the patient/guardian that if the Sx's persist or       

      worsen they need to return immediately for re-evaluation.                                   

                                                                                             

00:17 Response to treatment: the patient's symptoms have markedly improved after treatment,   snw 

      the patient's condition has returned to base line.                                          

                                                                                                  

                                                                                             

21:17 Order name: CBC with Diff                                                               snw 

                                                                                             

21:17 Order name: CMP                                                                         snw 

                                                                                             

21:17 Order name: Lipase                                                                      snw 

                                                                                             

21:51 Order name: CBC with Automated Diff; Complete Time: 21:58                               EDMS

                                                                                             

21:58 Order name: Comprehensive Metabolic Panel; Complete Time: 22:22                         EDMS

                                                                                             

21:17 Order name: CT Abd/Pelvis - IV Contrast Only                                            snw 

                                                                                             

21:58 Order name: Lipase; Complete Time: 22:22                                                EDMS

                                                                                             

22:24 Order name: CT; Complete Time: 22:41                                                    EDMS

                                                                                             

22:51 Order name: Urine Dipstick-Ancillary; Complete Time: 22:53                              EDMS

                                                                                             

23:26 Order name: Urine Microscopic Only; Complete Time: 23:27                                EDMS

                                                                                             

21:17 Order name: IV Saline Lock; Complete Time: 21:24                                        snw 

                                                                                             

21:17 Order name: Labs collected and sent; Complete Time: 21:41                               snw 

                                                                                             

22:43 Order name: Vital Signs; Complete Time: 22:52                                           snw 

                                                                                                  

Administered Medications:                                                                         

                                                                                             

21:33 Drug: NS 0.9% 1000 ml Route: IV; Rate: 1 bolus; Site: right antecubital;                ha1 

                                                                                             

00:43 Follow up: Response: No adverse reaction; IV Status: Completed infusion; IV Intake:     ha1 

      1000ml                                                                                      

                                                                                             

21:34 Drug: TORadol - (ketorolac) 15 mg Route: IVP; Site: right antecubital;                  ha1 

22:00 Follow up: Response: No adverse reaction; Pain is decreased                             ha1 

21:37 Drug: Pepcid (famotidine) 20 mg Route: IVP; Site: right antecubital;                    ha1 

22:00 Follow up: Response: No adverse reaction                                                ha1 

22:58 Drug: Simethicone 240 mg Route: PO;                                                     ha1 

23:15 Follow up: Response: No adverse reaction                                                ha1 

23:44 Drug: Dicyclomine 20 mg Route: PO;                                                      ha1 

                                                                                             

00:00 Follow up: Response: No adverse reaction                                                ha1 

                                                                                             

23:44 Drug: Ativan (LORazepam) 1 mg Route: PO;                                                ha1 

                                                                                             

00:00 Follow up: Response: No adverse reaction; Pain is decreased; RASS: Alert and Calm (0)   ha1 

                                                                                                  

                                                                                                  

Disposition:                                                                                      

07:43 Co-signature as Attending Physician, Maycol Marcial DO I was immediately available on-site ms3 

      in the Emergency Department for consultation in the care of the patient.                    

                                                                                                  

Disposition Summary:                                                                              

23 00:18                                                                                    

Discharge Ordered                                                                                 

      Location: Home                                                                          snw 

      Condition: Stable                                                                       snw 

      Diagnosis                                                                                   

        - Abdominal pain, unspecified                                                         snw 

      Followup:                                                                               snw 

        - With: Emergency Department                                                               

        - When: As needed                                                                          

        - Reason: Worsening of condition                                                           

      Followup:                                                                               snw 

        - With: Private Physician                                                                  

        - When: 2 - 3 days                                                                         

        - Reason: Recheck today's complaints, Continuance of care, Re-evaluation by your           

      physician                                                                                   

      Discharge Instructions:                                                                     

        - Discharge Summary Sheet                                                             snw 

        - Abdominal Pain, Adult                                                               snw 

        - High-Fiber Diet                                                                     snw 

        - Gas and Gas Pains, Pediatric                                                        snw 

      Forms:                                                                                      

        - Medication Reconciliation Form                                                      snw 

        - Thank You Letter                                                                    snw 

        - Antibiotic Education                                                                snw 

        - Prescription Opioid Use                                                             snw 

      Prescriptions:                                                                              

        - promethazine 25 mg Oral Tablet                                                           

            - take 1 tablet by ORAL route every 6 hours As needed; 20 tablet; Refills: 0,     snw 

      Product Selection Permitted                                                                 

        - dicyclomine 20 mg Oral Tablet                                                            

            - take 1 tablet by ORAL route 3 times per day; 21 tablet; Refills: 0, Product     snw 

      Selection Permitted                                                                         

Signatures:                                                                                       

Dispatcher [employer]                           EDMS                                                 

Maia Dial, BRIAP-C                   FNP-CsnMaycol Iniguez DO DO   ms3                                                  

Alexander Raymundo, RN                      RN   3                                                  

Mignon Hunt, RN                        RN   ha1                                                  

                                                                                                  

Corrections: (The following items were deleted from the chart)                                    

                                                                                             

21:40 21:17 Urine Microscopic+U.LAB.BRZ ordered. EDMS                                         EDMS

                                                                                                  

**************************************************************************************************

## 2023-02-13 NOTE — ER
Nurse's Notes                                                                                     

 [facility]                                                                 

Name: Adrianne Kamara                                                                              

Age: 58 yrs                                                                                       

Sex: Female                                                                                       

: 1965                                                                                   

MRN: [id number]                                                                                   

Arrival Date: 2023                                                                          

Time: 21:03                                                                                       

Account#: [id number]                                                                            

Bed 20                                                                                            

Private MD:                                                                                       

Diagnosis: Abdominal pain, unspecified                                                            

                                                                                                  

Presentation:                                                                                     

                                                                                             

21:17 Chief complaint: Patient states: C/o abdominal pain, N/V, and dry mouth since 1830.     ll3 

      Coronavirus screen: Vaccine status: Patient reports receiving the 2nd dose of the covid     

      vaccine. muscle pain, nausea, vomiting. Ebola Screen: No symptoms or risks identified       

      at this time. Initial Sepsis Screen: Does the patient meet any 2 criteria? No.              

      Patient's initial sepsis screen is negative. Does the patient have a suspected source       

      of infection? No. Patient's initial sepsis screen is negative. Risk Assessment: Do you      

      want to hurt yourself or someone else? Patient reports no desire to harm self or            

      others. Onset of symptoms was 2023 at 18:30.                                   

21:17 Method Of Arrival: Ambulatory                                                           ll3 

21:17 Acuity: ROSARIO 3                                                                           ll3 

                                                                                                  

Historical:                                                                                       

- Allergies:                                                                                      

21:19 Codeine;                                                                                ll3 

- Home Meds:                                                                                      

21:19 montelukast oral [Active];                                                              ll3 

- PMHx:                                                                                           

21:19 Asthma; Hypertensive disorder;                                                          ll3 

- PSHx:                                                                                           

21:19 Total abdominal hysterectomy; Gastric sleeve;                                           ll3 

                                                                                                  

- Immunization history:: Client reports receiving the 2nd dose of the Covid vaccine.              

- Social history:: Smoking status: Patient/guardian denies using tobacco.                         

                                                                                                  

                                                                                                  

Screenin:09 [facility] ED Fall Risk Assessment (Adult) History of falling in the last 3 months,       ha1 

      including since admission No falls in past 3 months (0 pts) Confusion or Disorientation     

      No (0 pts) Intoxicated or Sedated No (0 pts) Impaired Gait No (0 pts) Mobility Assist       

      Device Used No (0 pt) Altered Elimination No (0 pt) Score/Fall Risk Level 0 - 2 = Low       

      Risk Oriented to surroundings, Maintained a safe environment, Educated pt \T\ family on     

      fall prevention, incl call for assistance when getting out of bed.                          

22:51 Abuse screen: Denies threats or abuse. Denies injuries from another. Nutritional        ha1 

      screening: No deficits noted. Tuberculosis screening: No symptoms or risk factors           

      identified.                                                                                 

                                                                                                  

Assessment:                                                                                       

21:09 General: Appears uncomfortable, Behavior is cooperative, anxious. Pain: Complains of    ha1 

      pain in abdomen Pain does not radiate. Pain currently is 8 out of 10 on a pain scale.       

      Quality of pain is described as crampy, throbbing, Pain began suddenly, Alleviated by       

      medications. Neuro: Level of Consciousness is awake, alert, obeys commands, Oriented to     

      person, place, time, situation. Cardiovascular: Heart tones S1 S2 present Capillary         

      refill < 3 seconds Patient's skin is warm and dry. Respiratory: Airway is patent            

      Respiratory effort is even, unlabored, Respiratory pattern is regular, symmetrical. GI:     

      Abdomen is flat, non-distended, Bowel sounds present X 4 quads. Abd is soft and non         

      tender X 4 quads. Reports constipation, cramping, nausea, vomiting. : No signs and/or     

      symptoms were reported regarding the genitourinary system. EENT: No deficits noted. No      

      signs and/or symptoms were reported regarding the EENT system. Derm: Skin is pink, warm     

      \T\ dry. Musculoskeletal: Circulation, motion, and sensation intact. Range of motion:       

      intact in all extremities.                                                                  

22:10 Reassessment: Patient and/or family updated on plan of care and expected duration. Pain ha1 

      level reassessed. Patient is alert, oriented x 3, equal unlabored respirations, skin        

      warm/dry/pink. Patient states feeling better. Patient states symptoms have improved.        

22:55 Reassessment: Patient and/or family updated on plan of care and expected duration. Pain ha1 

      level reassessed. Patient is alert, oriented x 3, equal unlabored respirations, skin        

      warm/dry/pink. pain 7/10 notified care provider.                                            

23:29 Reassessment: Patient and/or family updated on plan of care and expected duration. Pain ha1 

      level reassessed. Patient is alert, oriented x 3, equal unlabored respirations, skin        

      warm/dry/pink. care provider in the room. pain 8/10.                                        

                                                                                             

00:23 Reassessment: Patient and/or family updated on plan of care and expected duration. Pain ha1 

      level reassessed. Patient is alert, oriented x 3, equal unlabored respirations, skin        

      warm/dry/pink. being discharged.                                                            

                                                                                                  

Vital Signs:                                                                                      

                                                                                             

21:09  / 91; Pulse 64; Resp 19 S; Pulse Ox 99% on R/A;                                  ha1 

21:17  / 115; Pulse 63; Resp 18; Temp 97.7(O); Pulse Ox 99% on R/A; Weight 61.69 kg     ll3 

      (R); Height 5 ft. 3 in. (160.02 cm) (R); Pain 8/10;                                         

22:10  / 87; Pulse 69; Resp 18 S; Pulse Ox 100% on R/A;                                 ha1 

22:55  / 87; Pulse 66; Resp 16 S; Pulse Ox 100% on R/A;                                 ha1 

23:30  / 90; Pulse 69; Resp 18 S; Pulse Ox 99% on R/A;                                  ha1 

                                                                                             

00:25  / 89; Pulse 65; Resp 18 S; Pulse Ox 99% on R/A;                                  ha1 

                                                                                             

21:17 Body Mass Index 24.09 (61.69 kg, 160.02 cm)                                             ll3 

                                                                                                  

ED Course:                                                                                        

                                                                                             

21:03 Patient arrived in ED.                                                                  mr  

21:03 Maia Dial, VIV is PHCP.                                                          snw 

21:04 Maycol Marcial DO is Attending Physician.                                                snw 

21:09 Patient has correct armband on for positive identification. Bed in low position. Call   [facility] 

      light in reach. Side rails up X 1. Adult w/ patient.                                        

21:11 Mignon Hunt, RN is Primary Nurse.                                                      ha1 

21:19 Triage completed.                                                                       ll3 

21:19 Arm band placed on Patient placed in an exam room, on a stretcher, on pulse oximetry.   ll3 

21:24 Inserted saline lock: 20 gauge in right antecubital area, using aseptic technique.      ha1 

      Blood collected.                                                                            

21:39 CBC with Diff Sent.                                                                     ha1 

21:39 CMP Sent.                                                                               ha1 

21:40 Lipase Sent.                                                                            ha1 

                                                                                             

00:43 No provider procedures requiring assistance completed. IV discontinued, intact,         ha1 

      bleeding controlled, No redness/swelling at site. Pressure dressing applied.                

                                                                                                  

Administered Medications:                                                                         

                                                                                             

21:33 Drug: NS 0.9% 1000 ml Route: IV; Rate: 1 bolus; Site: right antecubital;                ha1 

                                                                                             

00:43 Follow up: Response: No adverse reaction; IV Status: Completed infusion; IV Intake:     ha1 

      1000ml                                                                                      

                                                                                             

21:34 Drug: TORadol - (ketorolac) 15 mg Route: IVP; Site: right antecubital;                  ha1 

22:00 Follow up: Response: No adverse reaction; Pain is decreased                             ha1 

21:37 Drug: Pepcid (famotidine) 20 mg Route: IVP; Site: right antecubital;                    ha1 

22:00 Follow up: Response: No adverse reaction                                                ha1 

22:58 Drug: Simethicone 240 mg Route: PO;                                                     ha1 

23:15 Follow up: Response: No adverse reaction                                                ha1 

23:44 Drug: Dicyclomine 20 mg Route: PO;                                                      ha1 

                                                                                             

00:00 Follow up: Response: No adverse reaction                                                ha1 

                                                                                             

23:44 Drug: Ativan (LORazepam) 1 mg Route: PO;                                                ha1 

                                                                                             

00:00 Follow up: Response: No adverse reaction; Pain is decreased; RASS: Alert and Calm (0)   ha1 

                                                                                                  

                                                                                                  

Medication:                                                                                       

00:44 VIS not applicable for this client.                                                     ha1 

                                                                                                  

Intake:                                                                                           

00:43 IV: 1000ml; Total: 1000ml.                                                              ha1 

                                                                                                  

Outcome:                                                                                          

00:18 Discharge ordered by MD.                                                                barry 

00:43 Discharged to home via wheelchair, with family.                                         ha1 

00:43 Condition: stable                                                                           

00:43 Discharge instructions given to patient, family, Instructed on discharge instructions,      

      follow up and referral plans. medication usage, Demonstrated understanding of               

      instructions, follow-up care, medications, Prescriptions given X 2.                         

00:44 Patient left the ED.                                                                    ha1 

                                                                                                  

Signatures:                                                                                       

Maia Dial FNP-C                   FNP-Beanw                                                  

Lindsay Singh                                                   

Alexander Raymundo RN                      RN   3                                                  

Mignon Hunt RN                        RN   ha1                                                  

                                                                                                  

**************************************************************************************************